# Patient Record
Sex: FEMALE | Race: WHITE | NOT HISPANIC OR LATINO | Employment: OTHER | URBAN - METROPOLITAN AREA
[De-identification: names, ages, dates, MRNs, and addresses within clinical notes are randomized per-mention and may not be internally consistent; named-entity substitution may affect disease eponyms.]

---

## 2018-05-15 ENCOUNTER — OFFICE VISIT CONVERTED (OUTPATIENT)
Dept: CARDIOLOGY | Facility: CLINIC | Age: 68
End: 2018-05-15
Attending: SPECIALIST

## 2018-05-15 ENCOUNTER — CONVERSION ENCOUNTER (OUTPATIENT)
Dept: CARDIOLOGY | Facility: CLINIC | Age: 68
End: 2018-05-15

## 2018-11-27 ENCOUNTER — OFFICE VISIT CONVERTED (OUTPATIENT)
Dept: CARDIOLOGY | Facility: CLINIC | Age: 68
End: 2018-11-27
Attending: SPECIALIST

## 2018-11-27 ENCOUNTER — CONVERSION ENCOUNTER (OUTPATIENT)
Dept: OTHER | Facility: HOSPITAL | Age: 68
End: 2018-11-27

## 2019-11-12 ENCOUNTER — OFFICE VISIT CONVERTED (OUTPATIENT)
Dept: CARDIOLOGY | Facility: CLINIC | Age: 69
End: 2019-11-12
Attending: SPECIALIST

## 2019-11-18 ENCOUNTER — OFFICE VISIT CONVERTED (OUTPATIENT)
Dept: SURGERY | Facility: CLINIC | Age: 69
End: 2019-11-18
Attending: SURGERY

## 2020-01-22 ENCOUNTER — HOSPITAL ENCOUNTER (OUTPATIENT)
Dept: GASTROENTEROLOGY | Facility: HOSPITAL | Age: 70
Setting detail: HOSPITAL OUTPATIENT SURGERY
Discharge: HOME | End: 2020-01-22
Attending: SURGERY

## 2020-01-22 LAB — GLUCOSE BLD-MCNC: 195 MG/DL (ref 65–99)

## 2020-05-22 ENCOUNTER — TELEPHONE CONVERTED (OUTPATIENT)
Dept: CARDIOLOGY | Facility: CLINIC | Age: 70
End: 2020-05-22
Attending: SPECIALIST

## 2020-08-25 ENCOUNTER — OFFICE VISIT CONVERTED (OUTPATIENT)
Dept: CARDIOLOGY | Facility: CLINIC | Age: 70
End: 2020-08-25
Attending: SPECIALIST

## 2020-11-24 ENCOUNTER — HOSPITAL ENCOUNTER (OUTPATIENT)
Dept: GENERAL RADIOLOGY | Facility: HOSPITAL | Age: 70
Discharge: HOME | End: 2020-11-24
Attending: NURSE PRACTITIONER

## 2021-03-23 ENCOUNTER — OFFICE VISIT CONVERTED (OUTPATIENT)
Dept: CARDIOLOGY | Facility: CLINIC | Age: 71
End: 2021-03-23
Attending: SPECIALIST

## 2021-05-13 NOTE — PROGRESS NOTES
Quick Note      Patient Name: Kimber Carranza   Patient ID: [id number]   Sex: Female   YOB: 1950    Primary Care Provider: Gt Graf MD   Referring Provider: Kelsie Maier NP    Visit Date: May 22, 2020    Provider: Clifford Donald MD   Location: [facility]   Location Address: [address] [place] [address], [facility] [[email or web address]] A   [place]ADIA De lCid  [id number]   Location Phone: [phone]          History Of Present Illness  TELEHEALTH TELEPHONE VISIT  Chief Complaint: Hypertension, Palpitations   Kimber Carranza is a 70 year old /White female with a history of hypertension and palpitations. Blood pressure well controlled at home. No further palpitations. She is presenting for evaluation via telehealth telephone visit. Verbal consent obtained before beginning visit. Telehealth visit due to COVID-19.   Provider spent 6 minutes with the patient during telehealth visit.   The following staff were present during this visit: Provider only.   Past Medical History/Overview of Patient Symptoms     CURRENT MEDICATIONS:  Metoprolol ER 50 mg daily; lisinopril 30 mg daily; metformin 1,000 mg daily; hydrochlorothiazide 12.5 mg daily; fish oil 1,200 mg daily; Omega-3 daily; Caltrate +D 600 mg daily; Advil p.r.n.; Prilosec 20 mg p.r.n.    PAST MEDICAL HISTORY:  Diabetes mellitus; Diverticulitis; Essential Hypertension; Palpitations.      FAMILY HISTORY:  Positive for diabetes mellitus and hypertension.  Negative for heart disease.      PSYCHOSOCIAL HISTORY:  Denies mood changes or depression.  Rarely consumes alcohol.  Denies tobacco use.  Walks for exercise.      REVIEW OF SYSTEMS:   Cardiovascular:  Denies palpitations (fast, fluttering, or skipping beats), swelling (feet, ankles, hands), shortness of breath while walking or lying flat, chest pain or angina pectoris   Respiratory: Denies chronic or frequent cough, asthma or wheezing       Vitals     Per patient, at-home vitals:   Blood pressure 126/70.   "Heart rate 60.  Height 5'3\".  Weight 180.           Assessment     ASSESSMENT & PLAN:    1.  Essential hypertension, controlled.  Continue lisinopril and hydrochlorothiazide.  2.  Nonrheumatic mitral valve insufficiency, stable.  3.  Stable palpitations.  Continue Toprol.  4.  See me back in 3-4 months.             Electronically Signed by: Kaitlin Leary-, Other -Author on June 2, 2020 09:27:54 AM  Electronically Co-signed by: Clifford Donald MD -Reviewer on Jo Ann 3, 2020 10:17:52 AM  "

## 2021-05-13 NOTE — PROGRESS NOTES
"   Progress Note      Patient Name: Kimber Carranza   Patient ID: [id number]   Sex: Female   YOB: 1950    Primary Care Provider: Gt Graf MD   Referring Provider: Kelsie Maier NP    Visit Date: August 25, 2020    Provider: Clifford Donald MD   Location: [facility]   Location Address: [address] [place] [address], [facility]   [place]  [id number]   Location Phone: [phone]          Chief Complaint  · Hypertension   · Palpitations       History Of Present Illness  Kimber Carranza is a 70 year old /White female with a history of hypertension and palpitations. No further palpitations. No syncopal or presyncopal episodes.   CURRENT MEDICATIONS: include Metoprolol ER 50 mg daily; HCTZ 12.5 mg daily; Lisinopril 30 mg daily; Metformin 1000 mg daily; fish oil 500 mg daily; Caltrate 1000 mg daily. The dosage and frequency of the medications were reviewed with the patient.   PAST MEDICAL HISTORY: Diabetes mellitus; diverticulitis; essential hypertension; palpitations.   FAMILY HISTORY: Positive for diabetes and hypertension. Negative for heart disease.   PSYCHOSOCIAL HISTORY: No history of mood changes or depression. She rarely drinks alcohol and never used tobacco.       Review of Systems  · Cardiovascular  o Denies  o : palpitations (fast, fluttering, or skipping beats), swelling (feet, ankles, hands), shortness of breath while walking or lying flat, chest pain or angina pectoris   · Respiratory  o Denies  o : chronic or frequent cough, asthma or wheezing      Vitals  Date Time BP Position Site L\R Cuff Size HR RR TEMP (F) WT  HT  BMI kg/m2 BSA m2 O2 Sat        08/25/2020 09:59 /72 Sitting    70 - R   184lbs 16oz 5'  2\" 33.84 1.92           Physical Examination  · Constitutional  o Appearance  o : Awake, alert, cooperative, pleasant.  · Respiratory  o Inspection of Chest  o : No chest wall deformities, moving equal.  o Auscultation of Lungs  o : Good air entry with " vesicular breath sounds.  · Cardiovascular  o Heart  o :   § Auscultation of Heart  § : S1 and S2 regular. No S3. No S4. No murmurs.  o Peripheral Vascular System  o :   § Extremities  § : Peripheral pulses were well felt. No edema. No cyanosis.  · Gastrointestinal  o Abdominal Examination  o : No masses or organomegaly noted.     EKG was performed in the office today.  Indication:       palpitations.  Results:           sinus rhythm, left axis deviation, poor R wave progression, non-specific ST wave changes.  Comparison:    No change from previous EKG.             Assessment     ASSESSMENT AND PLAN:    1.  Essential hypertension controlled:  Continue current dose of Lisinopril and HCTZ.  2.  Palpitations, stable:  Continue current dose of Metoprolol.  3.  See me back in 6 months.    Clifford Donald MD, [facility]  ISHAAN/anam           This note was transcribed by Zaynab Menon.  anam/ISHAAN  The above service was transcribed by Zaynab Menon, and I attest to the accuracy of the note.  ISHAAN               Electronically Signed by: Zaynab Menon-, -Author on August 26, 2020 06:34:16 AM  Electronically Co-signed by: lCifford Donald MD -Reviewer on August 27, 2020 08:46:14 PM

## 2021-05-14 VITALS
BODY MASS INDEX: 34.78 KG/M2 | WEIGHT: 189 LBS | HEART RATE: 64 BPM | DIASTOLIC BLOOD PRESSURE: 74 MMHG | HEIGHT: 62 IN | SYSTOLIC BLOOD PRESSURE: 146 MMHG

## 2021-05-14 VITALS
SYSTOLIC BLOOD PRESSURE: 138 MMHG | HEIGHT: 62 IN | HEART RATE: 70 BPM | DIASTOLIC BLOOD PRESSURE: 72 MMHG | WEIGHT: 185 LBS | BODY MASS INDEX: 34.04 KG/M2

## 2021-05-14 NOTE — PROGRESS NOTES
"   Progress Note      Patient Name: Kimber Carranza   Patient ID: [id number]   Sex: Female   YOB: 1950    Primary Care Provider: Gt Graf MD   Referring Provider: Kelsie Maier NP    Visit Date: March 23, 2021    Provider: Clifford Donald MD   Location: [facility]   Location Address: [address] [place] [address], [address]   [place]  [id number]   Location Phone: [phone]          Chief Complaint     Palpitations and hypertension.       History Of Present Illness  Kimber Carranza is a 71 year old /White female with a history of hypertension and palpitations. No further palpitations. Blood pressure controlled at home.   CURRENT MEDICATIONS: Medication list was reviewed and is as documented.   PAST MEDICAL HISTORY: Diabetes mellitus; diverticulitis; essential hypertension; palpitations.   PSYCHOSOCIAL HISTORY: Rarely uses alcohol. Denies tobacco use.      ALLERGIES: No known drug allergies.       Review of Systems  · Cardiovascular  o Denies  o : palpitations (fast, fluttering, or skipping beats), swelling (feet, ankles, hands), shortness of breath while walking or lying flat, chest pain or angina pectoris   · Respiratory  o Denies  o : chronic or frequent cough      Vitals  Date Time BP Position Site L\R Cuff Size HR RR TEMP (F) WT  HT  BMI kg/m2 BSA m2 O2 Sat FR L/min FiO2 HC       03/23/2021 10:55 /74 Sitting    64 - R   189lbs 0oz 5'  2\" 34.57 1.94       03/23/2021 10:55 /72 Sitting    64 - R                   Physical Examination  · Constitutional  o Appearance  o : Awake, alert, cooperative, pleasant.  · Respiratory  o Inspection of Chest  o : No chest wall deformities, moving equal.  o Auscultation of Lungs  o : Good air entry with vesicular breath sounds.  · Cardiovascular  o Heart  o :   § Auscultation of Heart  § : S1 and S2 regular. No S3. No S4. No murmurs.  o Peripheral Vascular System  o :   § Extremities  § : Peripheral pulses were well felt. No edema. No " cyanosis.  · Gastrointestinal  o Abdominal Examination  o : No masses or organomegaly noted.          Assessment     1.  Stable palpitations. Continue current dose of metoprolol.  2.  Essential hypertension, controlled. Continue current dose of lisinopril and hydrochlorothiazide.     FOLLOW-UP:  See me back in 4 months.        Clifford Donald MD  ISHAAN/vh               Electronically Signed by: Paulette Jauregui-, OT -Author on April 3, 2021 09:04:54 AM  Electronically Co-signed by: Clifford Donald MD -Reviewer on April 4, 2021 10:06:53 AM

## 2021-05-15 VITALS
SYSTOLIC BLOOD PRESSURE: 130 MMHG | HEIGHT: 64 IN | WEIGHT: 185 LBS | DIASTOLIC BLOOD PRESSURE: 74 MMHG | HEART RATE: 68 BPM | BODY MASS INDEX: 31.58 KG/M2

## 2021-05-15 VITALS — HEIGHT: 63 IN | RESPIRATION RATE: 14 BRPM | BODY MASS INDEX: 33.02 KG/M2 | WEIGHT: 186.37 LBS

## 2021-05-16 VITALS
HEART RATE: 62 BPM | DIASTOLIC BLOOD PRESSURE: 82 MMHG | BODY MASS INDEX: 32.95 KG/M2 | WEIGHT: 193 LBS | HEIGHT: 64 IN | SYSTOLIC BLOOD PRESSURE: 126 MMHG

## 2021-05-16 VITALS
HEIGHT: 64 IN | DIASTOLIC BLOOD PRESSURE: 58 MMHG | SYSTOLIC BLOOD PRESSURE: 130 MMHG | HEART RATE: 60 BPM | WEIGHT: 192 LBS | BODY MASS INDEX: 32.78 KG/M2

## 2021-07-28 ENCOUNTER — OFFICE VISIT (OUTPATIENT)
Dept: CARDIOLOGY | Facility: CLINIC | Age: 71
End: 2021-07-28

## 2021-07-28 VITALS
DIASTOLIC BLOOD PRESSURE: 72 MMHG | HEART RATE: 66 BPM | SYSTOLIC BLOOD PRESSURE: 130 MMHG | BODY MASS INDEX: 35.51 KG/M2 | WEIGHT: 193 LBS | HEIGHT: 62 IN

## 2021-07-28 DIAGNOSIS — R00.2 PALPITATIONS: ICD-10-CM

## 2021-07-28 DIAGNOSIS — I10 ESSENTIAL HYPERTENSION: Primary | ICD-10-CM

## 2021-07-28 PROCEDURE — 99213 OFFICE O/P EST LOW 20 MIN: CPT | Performed by: NURSE PRACTITIONER

## 2021-07-28 RX ORDER — LISINOPRIL 30 MG/1
30 TABLET ORAL DAILY
COMMUNITY
Start: 2021-07-06 | End: 2021-10-22 | Stop reason: HOSPADM

## 2021-07-28 RX ORDER — METOPROLOL SUCCINATE 50 MG/1
50 TABLET, EXTENDED RELEASE ORAL DAILY
Qty: 90 TABLET | Refills: 3 | Status: SHIPPED | OUTPATIENT
Start: 2021-07-28 | End: 2021-10-22 | Stop reason: HOSPADM

## 2021-07-28 RX ORDER — METOPROLOL SUCCINATE 50 MG/1
50 TABLET, EXTENDED RELEASE ORAL DAILY
COMMUNITY
End: 2021-07-28 | Stop reason: SDUPTHER

## 2021-07-28 RX ORDER — HYDROCHLOROTHIAZIDE 12.5 MG/1
12.5 CAPSULE, GELATIN COATED ORAL DAILY
COMMUNITY
Start: 2021-06-03 | End: 2021-10-22 | Stop reason: HOSPADM

## 2021-07-28 NOTE — PROGRESS NOTES
"Chief Complaint  Follow-up    Subjective            History of Present Illness  Kimber Carranza is a 71-year-old white/ female patient who presents to the office today for follow-up.  She has history of essential hypertension and palpitations.  She brought with her a blood pressure log from home with readings from the past month showing elevated systolic blood pressure in the range of 130-150.  She reports compliance with all of her medications and states that the edema in her lower extremities has \"improved\".  She reports that she has increased her physical activity by \"walking more\".  She denies any chest pain, shortness of breath, lightheadedness, or palpitations.  She admits that she is planning to start back on her diet which will consist of \"low-carb and high-protein\" food choices which she is hoping will help to improve her blood pressure readings.    PMH  Past Medical History:   Diagnosis Date   • Diabetes mellitus (CMS/[facility])    • Essential hypertension 7/28/2021   • Palpitations 7/28/2021         ALLERGY  Allergies   Allergen Reactions   • Statins Other (See Comments)          SURGICALHX  History reviewed. No pertinent surgical history.       SOC  Social History     Socioeconomic History   • Marital status:      Spouse name: Not on file   • Number of children: Not on file   • Years of education: Not on file   • Highest education level: Not on file   Tobacco Use   • Smoking status: Never Smoker   • Smokeless tobacco: Never Used   Vaping Use   • Vaping Use: Never used   Substance and Sexual Activity   • Alcohol use: Never   • Drug use: Never   • Sexual activity: Defer         FAMHX  Family History   Problem Relation Age of Onset   • Hypertension Father           MEDSIGONLY  Current Outpatient Medications on File Prior to Visit   Medication Sig   • hydroCHLOROthiazide (MICROZIDE) 12.5 MG capsule Take 12.5 mg by mouth Daily.   • lisinopril (PRINIVIL,ZESTRIL) 30 MG tablet Take 30 mg by mouth " "Daily.   • metFORMIN (GLUCOPHAGE) 1000 MG tablet Take 1,000 mg by mouth 2 (two) times a day.   • metoprolol succinate XL (TOPROL-XL) 50 MG 24 hr tablet Take 50 mg by mouth Daily.     No current facility-administered medications on file prior to visit.         Objective   /72 (Patient Position: Sitting)   Pulse 66   Ht 157.5 cm (62\")   Wt 87.5 kg (193 lb)   BMI 35.30 kg/m²       Physical Exam  Constitutional:       Appearance: She is obese.   HENT:      Head: Normocephalic.   Cardiovascular:      Rate and Rhythm: Normal rate and regular rhythm.      Pulses: Normal pulses.      Heart sounds: Normal heart sounds. Heart sounds not distant.   Pulmonary:      Effort: Pulmonary effort is normal.      Breath sounds: Normal breath sounds.   Musculoskeletal:      Cervical back: Neck supple.      Right lower leg: No edema.      Left lower leg: No edema.   Skin:     General: Skin is dry.   Neurological:      Mental Status: She is alert and oriented to person, place, and time.   Psychiatric:         Mood and Affect: Mood normal.         Behavior: Behavior normal.       Result Review :   The following data was reviewed by: CAMI Medley on 07/28/2021:  No results found for: PROBNP    CBC w/diff    CBC w/Diff 7/27/21   WBC 7.53   RBC 4.58   Hemoglobin 13.2   Hematocrit 39.7   MCV 86.7   MCH 28.8   MCHC 33.2   RDW 12.2   Platelets 228   Neutrophil Rel % 58.4   Immature Granulocyte Rel % 0.4   Lymphocyte Rel % 29.1   Monocyte Rel % 8.4   Eosinophil Rel % 3.3   Basophil Rel % 0.4            No results found for: TSH   No results found for: FREET4   No results found for: DDIMERQUANT  No results found for: MG   No results found for: DIGOXIN   No results found for: TROPONINT       Lab 07/27/21  0738   HEMOGLOBIN A1C 8.3*             Assessment and Plan    Diagnoses and all orders for this visit:    1. Essential hypertension (Primary)   Currently still with some systolic hypertension, however she is making some " lifestyle modifications.  I advised that she continue to keep the home blood pressure log for the next month and to notify me if the systolic blood pressure remains above 140 after she has modified her diet.  We discussed low-sodium diet.  She verbalizes understanding and agrees at this time.    2. Palpitations   Currently controlled and without adverse effects of medications so continue current dose of metoprolol.  -     metoprolol succinate XL (TOPROL-XL) 50 MG 24 hr tablet; Take 1 tablet by mouth Daily.  Dispense: 90 tablet; Refill: 3        Follow Up   Return in about 6 months (around 1/28/2022) for Follow up with Dr Donald.    Patient was given instructions and counseling regarding her condition or for health maintenance advice. Please see specific information pulled into the AVS if appropriate.     Kimber Carranza  reports that she has never smoked. She has never used smokeless tobacco.       Lety Whitten, CAMI  07/28/21  11:07 EDT    Dictated Utilizing Dragon Dictation

## 2021-07-28 NOTE — PATIENT INSTRUCTIONS
Palpitations  Palpitations are feelings that your heartbeat is not normal. Your heartbeat may feel like it is:  · Uneven.  · Faster than normal.  · Fluttering.  · Skipping a beat.  This is usually not a serious problem. In some cases, you may need tests to rule out any serious problems.  Follow these instructions at home:  Pay attention to any changes in your condition. Take these actions to help manage your symptoms:  Eating and drinking  · Avoid:  ? Coffee, tea, soft drinks, and energy drinks.  ? Chocolate.  ? Alcohol.  ? Diet pills.  Lifestyle    · Try to lower your stress. These things can help you relax:  ? Yoga.  ? Deep breathing and meditation.  ? Exercise.  ? Using words and images to create positive thoughts (guided imagery).  ? Using your mind to control things in your body (biofeedback).  · Do not use drugs.  · Get plenty of rest and sleep. Keep a regular bed time.  General instructions    · Take over-the-counter and prescription medicines only as told by your doctor.  · Do not use any products that contain nicotine or tobacco, such as cigarettes and e-cigarettes. If you need help quitting, ask your doctor.  · Keep all follow-up visits as told by your doctor. This is important. You may need more tests if palpitations do not go away or get worse.  Contact a doctor if:  · Your symptoms last more than 24 hours.  · Your symptoms occur more often.  Get help right away if you:  · Have chest pain.  · Feel short of breath.  · Have a very bad headache.  · Feel dizzy.  · Pass out (faint).  Summary  · Palpitations are feelings that your heartbeat is uneven or faster than normal. It may feel like your heart is fluttering or skipping a beat.  · Avoid food and drinks that may cause palpitations. These include caffeine, chocolate, and alcohol.  · Try to lower your stress. Do not smoke or use drugs.  · Get help right away if you faint or have chest pain, shortness of breath, a severe headache, or dizziness.  This  "information is not intended to replace advice given to you by your health care provider. Make sure you discuss any questions you have with your health care provider.  Document Revised: 01/30/2019 Document Reviewed: 01/30/2019  [employer] Patient Education © 2021 [employer]      Low-Sodium Eating Plan  Sodium, which is an element that makes up salt, helps you maintain a healthy balance of fluids in your body. Too much sodium can increase your blood pressure and cause fluid and waste to be held in your body.  Your health care provider or dietitian may recommend following this plan if you have high blood pressure (hypertension), kidney disease, liver disease, or heart failure. Eating less sodium can help lower your blood pressure, reduce swelling, and protect your heart, liver, and kidneys.  What are tips for following this plan?  Reading food labels  · The Nutrition Facts label lists the amount of sodium in one serving of the food. If you eat more than one serving, you must multiply the listed amount of sodium by the number of servings.  · Choose foods with less than 140 mg of sodium per serving.  · Avoid foods with 300 mg of sodium or more per serving.  Shopping    · Look for lower-sodium products, often labeled as \"low-sodium\" or \"no salt added.\"  · Always check the sodium content, even if foods are labeled as \"unsalted\" or \"no salt added.\"  · Buy fresh foods.  ? Avoid canned foods and pre-made or frozen meals.  ? Avoid canned, cured, or processed meats.  · Buy breads that have less than 80 mg of sodium per slice.  Cooking    · Eat more home-cooked food and less restaurant, buffet, and fast food.  · Avoid adding salt when cooking. Use salt-free seasonings or herbs instead of table salt or sea salt. Check with your health care provider or pharmacist before using salt substitutes.  · Cook with plant-based oils, such as canola, sunflower, or olive oil.  Meal planning  · When eating at a restaurant, ask that your food " "be prepared with less salt or no salt, if possible. Avoid dishes labeled as brined, pickled, cured, smoked, or made with soy sauce, miso, or teriyaki sauce.  · Avoid foods that contain MSG (monosodium glutamate). MSG is sometimes added to Chinese food, bouillon, and some canned foods.  · Make meals that can be grilled, baked, poached, roasted, or steamed. These are generally made with less sodium.  General information  Most people on this plan should limit their sodium intake to 1,500-2,000 mg (milligrams) of sodium each day.  What foods should I eat?  Fruits  Fresh, frozen, or canned fruit. Fruit juice.  Vegetables  Fresh or frozen vegetables. \"No salt added\" canned vegetables. \"No salt added\" tomato sauce and paste. Low-sodium or reduced-sodium tomato and vegetable juice.  Grains  Low-sodium cereals, including oats, puffed wheat and rice, and shredded wheat. Low-sodium crackers. Unsalted rice. Unsalted pasta. Low-sodium bread. Whole-grain breads and whole-grain pasta.  Meats and other proteins  Fresh or frozen (no salt added) meat, poultry, seafood, and fish. Low-sodium canned tuna and salmon. Unsalted nuts. Dried peas, beans, and lentils without added salt. Unsalted canned beans. Eggs. Unsalted nut butters.  Dairy  Milk. Soy milk. Cheese that is naturally low in sodium, such as ricotta cheese, fresh mozzarella, or Swiss cheese. Low-sodium or reduced-sodium cheese. Cream cheese. Yogurt.  Seasonings and condiments  Fresh and dried herbs and spices. Salt-free seasonings. Low-sodium mustard and ketchup. Sodium-free salad dressing. Sodium-free light mayonnaise. Fresh or refrigerated horseradish. Lemon juice. Vinegar.  Other foods  Homemade, reduced-sodium, or low-sodium soups. Unsalted popcorn and pretzels. Low-salt or salt-free chips.  The items listed above may not be a complete list of foods and beverages you can eat. Contact a dietitian for more information.  What foods should I avoid?  Vegetables  Deepthi, " pickled vegetables, and relishes. Olives. French fries. Onion rings. Regular canned vegetables (not low-sodium or reduced-sodium). Regular canned tomato sauce and paste (not low-sodium or reduced-sodium). Regular tomato and vegetable juice (not low-sodium or reduced-sodium). Frozen vegetables in sauces.  Grains  Instant hot cereals. Bread stuffing, pancake, and biscuit mixes. Croutons. Seasoned rice or pasta mixes. Noodle soup cups. Boxed or frozen macaroni and cheese. Regular salted crackers. Self-rising flour.  Meats and other proteins  Meat or fish that is salted, canned, smoked, spiced, or pickled. Precooked or cured meat, such as sausages or meat loaves. Dias. Ham. Pepperoni. Hot dogs. Corned beef. Chipped beef. Salt pork. Jerky. Pickled herring. Anchovies and sardines. Regular canned tuna. Salted nuts.  Dairy  Processed cheese and cheese spreads. Hard cheeses. Cheese curds. Blue cheese. Feta cheese. String cheese. Regular cottage cheese. Buttermilk. Canned milk.  Fats and oils  Salted butter. Regular margarine. Ghee. Dias fat.  Seasonings and condiments  Onion salt, garlic salt, seasoned salt, table salt, and sea salt. Canned and packaged gravies. [facility] Hospital[facility] sauce. Tartar sauce. Barbecue sauce. Teriyaki sauce. Soy sauce, including reduced-sodium. Steak sauce. Fish sauce. Oyster sauce. Cocktail sauce. Horseradish that you find on the shelf. Regular ketchup and mustard. Meat flavorings and tenderizers. Bouillon cubes. Hot sauce. Pre-made or packaged marinades. Pre-made or packaged taco seasonings. Relishes. Regular salad dressings. Salsa.  Other foods  Salted popcorn and pretzels. Corn chips and puffs. Potato and tortilla chips. Canned or dried soups. Pizza. Frozen entrees and pot pies.  The items listed above may not be a complete list of foods and beverages you should avoid. Contact a dietitian for more information.  Summary  · Eating less sodium can help lower your blood pressure, reduce swelling, and  protect your heart, liver, and kidneys.  · Most people on this plan should limit their sodium intake to 1,500-2,000 mg (milligrams) of sodium each day.  · Canned, boxed, and frozen foods are high in sodium. Restaurant foods, fast foods, and pizza are also very high in sodium. You also get sodium by adding salt to food.  · Try to cook at home, eat more fresh fruits and vegetables, and eat less fast food and canned, processed, or prepared foods.  This information is not intended to replace advice given to you by your health care provider. Make sure you discuss any questions you have with your health care provider.  Document Revised: 01/22/2021 Document Reviewed: 11/18/2020  [employer] Patient Education © 2021 [employer]

## 2021-10-18 ENCOUNTER — HOSPITAL ENCOUNTER (INPATIENT)
Facility: HOSPITAL | Age: 71
LOS: 4 days | Discharge: NURSING, REHAB OR LONG-TERM CARE | End: 2021-10-22
Attending: EMERGENCY MEDICINE | Admitting: FAMILY MEDICINE

## 2021-10-18 ENCOUNTER — APPOINTMENT (OUTPATIENT)
Dept: MRI IMAGING | Facility: HOSPITAL | Age: 71
End: 2021-10-18

## 2021-10-18 ENCOUNTER — APPOINTMENT (OUTPATIENT)
Dept: CT IMAGING | Facility: HOSPITAL | Age: 71
End: 2021-10-18

## 2021-10-18 ENCOUNTER — APPOINTMENT (OUTPATIENT)
Dept: GENERAL RADIOLOGY | Facility: HOSPITAL | Age: 71
End: 2021-10-18

## 2021-10-18 DIAGNOSIS — Z78.9 DECREASED ACTIVITIES OF DAILY LIVING (ADL): ICD-10-CM

## 2021-10-18 DIAGNOSIS — I10 PRIMARY HYPERTENSION: ICD-10-CM

## 2021-10-18 DIAGNOSIS — I60.9 SUBARACHNOID HEMORRHAGE (HCC): ICD-10-CM

## 2021-10-18 DIAGNOSIS — R26.2 DIFFICULTY WALKING: ICD-10-CM

## 2021-10-18 DIAGNOSIS — I61.9 INTRAPARENCHYMAL HEMORRHAGE OF BRAIN (HCC): Primary | ICD-10-CM

## 2021-10-18 LAB
ABO GROUP BLD: NORMAL
ABO GROUP BLD: NORMAL
ALBUMIN SERPL-MCNC: 4.4 G/DL (ref 3.5–5.2)
ALBUMIN/GLOB SERPL: 1.6 G/DL
ALP SERPL-CCNC: 56 U/L (ref 39–117)
ALT SERPL W P-5'-P-CCNC: 43 U/L (ref 1–33)
ANION GAP SERPL CALCULATED.3IONS-SCNC: 12.7 MMOL/L (ref 5–15)
AST SERPL-CCNC: 38 U/L (ref 1–32)
BASOPHILS # BLD AUTO: 0.04 10*3/MM3 (ref 0–0.2)
BASOPHILS NFR BLD AUTO: 0.4 % (ref 0–1.5)
BILIRUB SERPL-MCNC: 0.5 MG/DL (ref 0–1.2)
BLD GP AB SCN SERPL QL: NEGATIVE
BUN SERPL-MCNC: 14 MG/DL (ref 8–23)
BUN/CREAT SERPL: 14.7 (ref 7–25)
CALCIUM SPEC-SCNC: 9.3 MG/DL (ref 8.6–10.5)
CHLORIDE SERPL-SCNC: 98 MMOL/L (ref 98–107)
CO2 SERPL-SCNC: 25.3 MMOL/L (ref 22–29)
CREAT BLDA-MCNC: 0.8 MG/DL
CREAT SERPL-MCNC: 0.95 MG/DL (ref 0.57–1)
DEPRECATED RDW RBC AUTO: 39.8 FL (ref 37–54)
EOSINOPHIL # BLD AUTO: 0.08 10*3/MM3 (ref 0–0.4)
EOSINOPHIL NFR BLD AUTO: 0.8 % (ref 0.3–6.2)
ERYTHROCYTE [DISTWIDTH] IN BLOOD BY AUTOMATED COUNT: 12.5 % (ref 12.3–15.4)
GFR SERPL CREATININE-BSD FRML MDRD: 58 ML/MIN/1.73
GLOBULIN UR ELPH-MCNC: 2.8 GM/DL
GLUCOSE BLDC GLUCOMTR-MCNC: 159 MG/DL (ref 70–99)
GLUCOSE BLDC GLUCOMTR-MCNC: 166 MG/DL (ref 70–99)
GLUCOSE BLDC GLUCOMTR-MCNC: 196 MG/DL (ref 70–99)
GLUCOSE SERPL-MCNC: 177 MG/DL (ref 65–99)
HCT VFR BLD AUTO: 41.4 % (ref 34–46.6)
HGB BLD-MCNC: 13.9 G/DL (ref 12–15.9)
HOLD SPECIMEN: NORMAL
HOLD SPECIMEN: NORMAL
IMM GRANULOCYTES # BLD AUTO: 0.05 10*3/MM3 (ref 0–0.05)
IMM GRANULOCYTES NFR BLD AUTO: 0.5 % (ref 0–0.5)
INR PPP: 0.99 (ref 2–3)
LYMPHOCYTES # BLD AUTO: 1.57 10*3/MM3 (ref 0.7–3.1)
LYMPHOCYTES NFR BLD AUTO: 14.7 % (ref 19.6–45.3)
MCH RBC QN AUTO: 29.1 PG (ref 26.6–33)
MCHC RBC AUTO-ENTMCNC: 33.6 G/DL (ref 31.5–35.7)
MCV RBC AUTO: 86.8 FL (ref 79–97)
MONOCYTES # BLD AUTO: 0.53 10*3/MM3 (ref 0.1–0.9)
MONOCYTES NFR BLD AUTO: 5 % (ref 5–12)
NEUTROPHILS NFR BLD AUTO: 78.6 % (ref 42.7–76)
NEUTROPHILS NFR BLD AUTO: 8.38 10*3/MM3 (ref 1.7–7)
NRBC BLD AUTO-RTO: 0 /100 WBC (ref 0–0.2)
PLATELET # BLD AUTO: 204 10*3/MM3 (ref 140–450)
PMV BLD AUTO: 10.7 FL (ref 6–12)
POTASSIUM SERPL-SCNC: 4 MMOL/L (ref 3.5–5.2)
PROT SERPL-MCNC: 7.2 G/DL (ref 6–8.5)
PROTHROMBIN TIME: 10.9 SECONDS (ref 9.4–12)
RBC # BLD AUTO: 4.77 10*6/MM3 (ref 3.77–5.28)
RH BLD: POSITIVE
RH BLD: POSITIVE
SODIUM SERPL-SCNC: 136 MMOL/L (ref 136–145)
T&S EXPIRATION DATE: NORMAL
WBC # BLD AUTO: 10.65 10*3/MM3 (ref 3.4–10.8)
WHOLE BLOOD HOLD SPECIMEN: NORMAL
WHOLE BLOOD HOLD SPECIMEN: NORMAL

## 2021-10-18 PROCEDURE — 25010000002 CLEVIDIPINE 50 MG/100ML EMULSION: Performed by: FAMILY MEDICINE

## 2021-10-18 PROCEDURE — 99291 CRITICAL CARE FIRST HOUR: CPT | Performed by: INTERNAL MEDICINE

## 2021-10-18 PROCEDURE — 25010000002 CLEVIDIPINE BUTYRATE PER 1 MG: Performed by: EMERGENCY MEDICINE

## 2021-10-18 PROCEDURE — C9248 INJ, CLEVIDIPINE BUTYRATE: HCPCS | Performed by: FAMILY MEDICINE

## 2021-10-18 PROCEDURE — 86901 BLOOD TYPING SEROLOGIC RH(D): CPT | Performed by: EMERGENCY MEDICINE

## 2021-10-18 PROCEDURE — 80053 COMPREHEN METABOLIC PANEL: CPT | Performed by: EMERGENCY MEDICINE

## 2021-10-18 PROCEDURE — 85610 PROTHROMBIN TIME: CPT | Performed by: EMERGENCY MEDICINE

## 2021-10-18 PROCEDURE — 0 IOPAMIDOL PER 1 ML: Performed by: EMERGENCY MEDICINE

## 2021-10-18 PROCEDURE — 70551 MRI BRAIN STEM W/O DYE: CPT

## 2021-10-18 PROCEDURE — 82962 GLUCOSE BLOOD TEST: CPT

## 2021-10-18 PROCEDURE — 86901 BLOOD TYPING SEROLOGIC RH(D): CPT

## 2021-10-18 PROCEDURE — 85025 COMPLETE CBC W/AUTO DIFF WBC: CPT | Performed by: EMERGENCY MEDICINE

## 2021-10-18 PROCEDURE — 93005 ELECTROCARDIOGRAM TRACING: CPT | Performed by: EMERGENCY MEDICINE

## 2021-10-18 PROCEDURE — C9248 INJ, CLEVIDIPINE BUTYRATE: HCPCS | Performed by: EMERGENCY MEDICINE

## 2021-10-18 PROCEDURE — 94799 UNLISTED PULMONARY SVC/PX: CPT

## 2021-10-18 PROCEDURE — 71045 X-RAY EXAM CHEST 1 VIEW: CPT

## 2021-10-18 PROCEDURE — 94762 N-INVAS EAR/PLS OXIMTRY CONT: CPT

## 2021-10-18 PROCEDURE — 99284 EMERGENCY DEPT VISIT MOD MDM: CPT

## 2021-10-18 PROCEDURE — 99223 1ST HOSP IP/OBS HIGH 75: CPT | Performed by: FAMILY MEDICINE

## 2021-10-18 PROCEDURE — 86900 BLOOD TYPING SEROLOGIC ABO: CPT

## 2021-10-18 PROCEDURE — 86850 RBC ANTIBODY SCREEN: CPT | Performed by: EMERGENCY MEDICINE

## 2021-10-18 PROCEDURE — 70450 CT HEAD/BRAIN W/O DYE: CPT

## 2021-10-18 PROCEDURE — 82565 ASSAY OF CREATININE: CPT

## 2021-10-18 PROCEDURE — 86900 BLOOD TYPING SEROLOGIC ABO: CPT | Performed by: EMERGENCY MEDICINE

## 2021-10-18 PROCEDURE — 70496 CT ANGIOGRAPHY HEAD: CPT

## 2021-10-18 RX ORDER — SODIUM CHLORIDE 0.9 % (FLUSH) 0.9 %
10 SYRINGE (ML) INJECTION EVERY 12 HOURS SCHEDULED
Status: DISCONTINUED | OUTPATIENT
Start: 2021-10-18 | End: 2021-10-22 | Stop reason: HOSPADM

## 2021-10-18 RX ORDER — ACETAMINOPHEN 325 MG/1
650 TABLET ORAL EVERY 6 HOURS PRN
Status: DISCONTINUED | OUTPATIENT
Start: 2021-10-18 | End: 2021-10-22 | Stop reason: HOSPADM

## 2021-10-18 RX ORDER — SODIUM CHLORIDE 0.9 % (FLUSH) 0.9 %
10 SYRINGE (ML) INJECTION AS NEEDED
Status: DISCONTINUED | OUTPATIENT
Start: 2021-10-18 | End: 2021-10-22 | Stop reason: HOSPADM

## 2021-10-18 RX ORDER — DEXTROSE MONOHYDRATE 100 MG/ML
25 INJECTION, SOLUTION INTRAVENOUS
Status: DISCONTINUED | OUTPATIENT
Start: 2021-10-18 | End: 2021-10-22 | Stop reason: HOSPADM

## 2021-10-18 RX ORDER — LABETALOL HYDROCHLORIDE 5 MG/ML
40 INJECTION, SOLUTION INTRAVENOUS EVERY 4 HOURS
Status: DISCONTINUED | OUTPATIENT
Start: 2021-10-18 | End: 2021-10-19

## 2021-10-18 RX ORDER — ONDANSETRON 2 MG/ML
4 INJECTION INTRAMUSCULAR; INTRAVENOUS EVERY 6 HOURS PRN
Status: DISCONTINUED | OUTPATIENT
Start: 2021-10-18 | End: 2021-10-22 | Stop reason: HOSPADM

## 2021-10-18 RX ORDER — MELATONIN
1000 DAILY
COMMUNITY

## 2021-10-18 RX ORDER — VERAPAMIL HYDROCHLORIDE 2.5 MG/ML
5 INJECTION, SOLUTION INTRAVENOUS ONCE
Status: COMPLETED | OUTPATIENT
Start: 2021-10-18 | End: 2021-10-18

## 2021-10-18 RX ORDER — NICOTINE POLACRILEX 4 MG
15 LOZENGE BUCCAL
Status: DISCONTINUED | OUTPATIENT
Start: 2021-10-18 | End: 2021-10-22 | Stop reason: HOSPADM

## 2021-10-18 RX ORDER — CHLORAL HYDRATE 500 MG
1200 CAPSULE ORAL
COMMUNITY

## 2021-10-18 RX ADMIN — CLEVIPIDINE 6 MG/HR: 0.5 EMULSION INTRAVENOUS at 16:20

## 2021-10-18 RX ADMIN — CLEVIPIDINE 2 MG/HR: 0.5 EMULSION INTRAVENOUS at 11:37

## 2021-10-18 RX ADMIN — CLEVIPIDINE 2 MG/HR: 0.5 EMULSION INTRAVENOUS at 11:33

## 2021-10-18 RX ADMIN — VERAPAMIL HYDROCHLORIDE 5 MG: 2.5 INJECTION, SOLUTION INTRAVENOUS at 18:41

## 2021-10-18 RX ADMIN — IOPAMIDOL 100 ML: 755 INJECTION, SOLUTION INTRAVENOUS at 14:25

## 2021-10-18 RX ADMIN — ACETAMINOPHEN 650 MG: 325 TABLET ORAL at 23:33

## 2021-10-18 RX ADMIN — SODIUM CHLORIDE, PRESERVATIVE FREE 10 ML: 5 INJECTION INTRAVENOUS at 22:30

## 2021-10-18 NOTE — ED PROVIDER NOTES
Subjective   History of Present Illness  The patient is a 71-year-old female who presents to the ER with her family for evaluation of confusion/disorientation.  Patient states that she went to bed last night her normal time without any complaints.  She states that she woke up this morning feeling very disoriented.  When asked what she meant by this she states that she is very confused, she could not figure out stuff.  She gave an example if she did not know how to make tea this morning.  She also complains of some pain and pressure on the right side of her head and retro-orbital in nature.  Denies being on any blood thinners.  Denies recent head trauma or fall.    Review of Systems   Constitutional: Negative.    Eyes: Negative.    Respiratory: Negative.    Cardiovascular: Negative.    Gastrointestinal: Negative.    Endocrine: Negative.    Genitourinary: Negative.    Musculoskeletal: Negative.    Skin: Negative.    Allergic/Immunologic: Negative.    Neurological: Positive for headaches.   Hematological: Negative.    Psychiatric/Behavioral: Negative.    All other systems reviewed and are negative.      Past Medical History:   Diagnosis Date   • Diabetes mellitus (HCC)    • Essential hypertension 7/28/2021   • Palpitations 7/28/2021       Allergies   Allergen Reactions   • Statins Other (See Comments)       Past Surgical History:   Procedure Laterality Date   • HYSTERECTOMY         Family History   Problem Relation Age of Onset   • Hypertension Father        Social History     Socioeconomic History   • Marital status:    Tobacco Use   • Smoking status: Never Smoker   • Smokeless tobacco: Never Used   Vaping Use   • Vaping Use: Never used   Substance and Sexual Activity   • Alcohol use: Never   • Drug use: Never   • Sexual activity: Defer           Objective   Physical Exam  Vital signs were reviewed.  General appearance - Patient appears well-developed and well-nourished.  Patient is in no acute distress.  Head  - Normocephalic, atraumatic.  Pupils - Equal, round, reactive to light.  Extraocular muscles are intact.  Conjunctive is clear  Tympanic membranes - Gray, intact without erythema or retractions.  Oral mucosa - Pink and moist without lesions or erythema.  Uvula is midline.  Neck - Supple.  Trachea was midline.  There is no palpable lymphadenopathy or thyromegaly.  There are no meningeal signs  Lungs - Clear to auscultation and percussion bilaterally.  Heart - Regular rate and rhythm without any murmurs, clicks, or gallops.  Abdomen - Soft.  Bowel sounds are present.  There is no rebound, guarding, or rigidity.  There are no palpable masses.  There are no pulsatile masses.  Back - Spine is straight and midline.  There is no CVA tenderness.  Extremities - Intact x4 with full range of motion.  There is no palpable edema.  Neurologic - Patient is awake, alert, and oriented x3.  Answers all questions appropriately.  Cranial nerves II through XII are grossly intact.  Motor and sensory functions grossly intact.  Cerebellar function was normal.  Integument - There are no rashes.  There are no petechia or purpura lesions noted.  There are no vesicular lesions noted.    Procedures           ED Course         EKG performed at 1213 was interpreted by me to show a normal sinus rhythm with a ventricular rate of 83 beats minute.  The computer read this up to be atrial fibrillation however feel this is a sinus rhythm as I see P waves before every QRS weight.  P waves are normal.  QRS interval was normal.  Axis was leftward -51 degrees.  There is no acute ischemic ST or T wave change identified.  QT corrected was normal at 409 ms.    The patient was seen evaluated by me in the ED.  The above history and physical examination was performed as stated.  CT scan was reviewed by me.  I discussed the findings also with the radiologist.  The patient along with her  and daughter were also informed of the CT findings.  I did consult   Samy neurosurgeon, at 1230 who reviewed the CT findings and agreed that the patient could be managed at [facility].  Patient be admitted to the ICU.  Patient was started on Cleviprex with a target systolic blood pressure goal of 140.  Hospital service was consulted for admission.  Dr. Lovett did asked I order a CTA of the head which he will follow up on the results.      45 minutes of critical care provided. This time excludes other billable procedures. Time does include preparation of documents, medical consultations, review of old records, and direct bedside care. Patient was at high risk for life-threatening deterioration due to intracranial hemorrhage and systolic hypertension management.                                MDM    Final diagnoses:   Intraparenchymal hemorrhage of brain (HCC)   Subarachnoid hemorrhage (HCC)   Primary hypertension       ED Disposition  ED Disposition     ED Disposition Condition Comment    Decision to Admit            No follow-up provider specified.       Medication List      No changes were made to your prescriptions during this visit.          Kendall Taylor,   10/18/21 [id number]

## 2021-10-18 NOTE — H&P
[facility]  Date: 10/18/2021   Patient Name: Kimber Carranza  : 1950  MRN: [id number]  Primary Care Physician:  Kelsie Maier, [facility]  Date of admission: 10/18/2021    Subjective   Subjective     Chief complaint: Altered mental status    HPI:  71-year-old female with history of essential hypertension, diabetes mellitus, mixed hyperlipidemia, presented to the emergency room with chief complaint of altered mental status. Woke up with cofusion.  stated at the bedside he thinks symptoms occurred the night before when she became frustrated while trying to pay bills online.  She went to bed and woke up with worsening confusion, complaints of a headache, mostly right-sided, holding her right eye.  There is no recent illness, fevers, chills or sweats.  There is no reported head trauma either or loss of consciousness.In the emergency room she was found to be profoundly hypertensive systolic in the 180s, head CT revealed intraparenchymal hemorrhage in the right occipital lobe, right temporal lobe, with subarachnoid hemorrhage within both parietal lobes and inferior right temporal lobe.  Neurosurgery was consulted, hospital service requested to admit and further manage.    Personal History     Past Medical History:  Past Medical History:   Diagnosis Date   • Diabetes mellitus (HCC)    • Essential hypertension 2021   • Palpitations 2021       Past Surgical History:  Past Surgical History:   Procedure Laterality Date   • HYSTERECTOMY           Family History:   Family History   Problem Relation Age of Onset   • Hypertension Father          Social History:   Social History     Socioeconomic History   • Marital status:    Tobacco Use   • Smoking status: Never Smoker   • Smokeless tobacco: Never Used   Vaping Use   • Vaping Use: Never used   Substance and Sexual Activity   • Alcohol use: Never   • Drug use: Never   • Sexual activity: Defer         Home  Medications:  cholecalciferol, fish oil, hydroCHLOROthiazide, lisinopril, metFORMIN, and metoprolol succinate XL    Allergies:  Allergies   Allergen Reactions   • Statins Other (See Comments)       Review of Systems  12 Point review of systems obtained, pertinent positives include change in vision, lethargy, weakness, right-sided eye pain, right-sided headache, photophobia.  Pertinent negatives include fevers, chills, sweats, dizziness, lightheadedness, nausea, vomiting, chest pain, palpitations, abdominal pain, dysuria, bowel incontinence, focal neurological weakness affecting arms or legs.    Objective   Objective     Vitals:   Temp:  [97.8 °F (36.6 °C)] 97.8 °F (36.6 °C)  Heart Rate:  [70-87] 84  Resp:  [17] 17  BP: (140-183)/(62-87) 142/68    Physical Exam    Constitutional: Awake, alert, no acute distress   Eyes: Pupils equal, sclerae anicteric, no conjunctival injection, blurry vision   HENT: NCAT, mucous membranes moist,   Neck: Supple, no thyromegaly, no lymphadenopathy, trachea midline   Respiratory: Clear to auscultation bilaterally, nonlabored respirations    Cardiovascular: RRR, no murmurs, rubs, or gallops, palpable pedal pulses bilaterally   Gastrointestinal: Positive bowel sounds, soft, nontender, nondistended   Musculoskeletal: No bilateral ankle edema, no clubbing or cyanosis to extremities   Psychiatric: Appropriate affect, cooperative   Neurologic: Oriented x 3, strength symmetric in all extremities, Cranial Nerves grossly intact to confrontation, speech clear   Skin: No rashes     Result Review    Result Review:  I have personally reviewed the results from the time of this admission to 10/18/2021 13:23 EDT and agree with these findings:  [x]  Laboratory  [x]  Microbiology  [x]  Radiology  [x]  EKG/Telemetry   []  Cardiology/Vascular   []  Pathology  [x]  Old records  []  Other:      Assessment/Plan   Assessment / Plan     Assessment/Plan:   Assessment:  Acute hemorrhagic stroke  Intraparenchymal  hematomas in the posterior right cerebral hemisphere; occipital, temporal,  Subarachnoid hemorrhage bilateral parietal lobes, inferior right temporal  Hypertensive crisis  Essential hypertension; uncontrolled  Diabetes mellitus  Dyslipidemia    Plan:  Labs imaging reviewed  Admit to critical care unit  Cleviprex to maintain blood pressure systolic less than 140s  Neurochecks per protocol  Stroke protocol  No anticoagulation  High intensity statin  CTA head reviewed  MRI brain pending  Echocardiogram pending  Neurology consulted Dr. Guadarrama, follow-up recommendations  Neurosurgery consulted from emergency room Dr. Pablo, follow-up recommendations  Stat head CT with any change in mentation  Sliding-scale insulin coverage  Insulin sliding scale coverage  N.p.o.  PT/OT  Speech evaluation  Diet per speech  A.m. labs  VTE prophylaxis SCDs  Clinical course to dictate further management  Discussed with nurse at the bedside, ER Dr. Taylor, patient's  and daughter at the bedside      DVT prophylaxis:  No DVT prophylaxis order currently exists.    CODE STATUS:    Code Status: CPR  Medical Interventions (Level of Support Prior to Arrest): Full      Admission Status:  I believe this patient meets inpatient status.    Electronically signed by Morenita Lovett MD, 10/18/21, 1:23 PM EDT.

## 2021-10-18 NOTE — CONSULTS
[facility]   HISTORY AND PHYSICAL    Patient Name: Kimber Carranza  : 1950  MRN: [id number]  Primary Care Physician:  Kelsie Maier APRN  Date of admission: 10/18/2021  ICU LOS: Patient does not have an ICU stay during this admission.      Subjective   Subjective     Chief Complaint: Hemorrhagic stroke    HPI:    Kimber Carranza is a 71 y.o. female with past medical history significant for hypertension who presented to the ED today complaining of brain fog and difficulty concentrating ongoing for the last 1-2 days.  Patient denies doing anything specific when symptom onset occurred.  Upon arrival to the ED, /81.  Work-up in the ED revealed head CT without contrast showing intraparenchymal hemorrhage right occipital lobe and right temporal lobe with subarachnoid hemorrhage seen within bilateral parietal lobes and inferior right temporal lobe.  Neurosurgery was consulted and did not recommend surgical interventions at this time.  Hospital service contacted for admission.  Patient was admitted to ICU and our service was consulted for further evaluation critical care management.  Patient was started on Cleviprex drip while in the ED with target blood pressure 140 or less, remains on Cleviprex.    Review of Systems  Unable to fully obtain secondary patient status of being optimally responsive/confused    Does complain of brain fog and having difficulty finding her words    Personal History     Past Medical History:   Diagnosis Date   • Diabetes mellitus (HCC)    • Elevated cholesterol    • Essential hypertension 2021   • Palpitations 2021   • Stroke (HCC)        Past Surgical History:   Procedure Laterality Date   • HYSTERECTOMY         Family History: family history includes Hypertension in her father. Otherwise pertinent FHx was reviewed and not pertinent to current issue.    Social History:  reports that she has never smoked. She has never used smokeless tobacco. She reports  that she does not drink alcohol and does not use drugs.    Home Medications:  cholecalciferol, fish oil, hydroCHLOROthiazide, lisinopril, metFORMIN, and metoprolol succinate XL      Allergies:  Allergies   Allergen Reactions   • Statins GI Intolerance       Objective   Objective     Vitals:   Temp:  [97.8 °F (36.6 °C)-98 °F (36.7 °C)] 98 °F (36.7 °C)  Heart Rate:  [70-92] 89  Resp:  [13-21] 13  BP: (137-183)/(59-87) 137/59    Physical Exam  Vital Signs Reviewed  General: WDWN, Alert, NAD.    HEENT:  PERRL, EOMI. WITH LEFT-SIDED NEGLECT, mild right-sided facial droop, OP, nares clear  Neck:  Supple, no JVD, no thyromegaly  Chest:  good aeration, clear to auscultation bilaterally, no work of breathing noted  CV: RRR, no MGR, pulses 2+, equal.  Abd:  Soft, NT, ND, + BS, no HSM  Ext:  no clubbing, no cyanosis, no edema  Neuro:  A&Ox3, does appear to have difficulty with word finding, motor strength intact in lower extremities, RUE <LUE, sensation intact  Skin: No rashes or lesions noted      Result Review    Result Review:  I have personally reviewed the results from the time of this admission to 10/18/2021 17:31 EDT and agree with these findings:  [x]  Laboratory  []  Microbiology  [x]  Radiology  []  EKG/Telemetry   []  Cardiology/Vascular   []  Pathology  []  Old records  []  Other:    Assessment/Plan   Assessment / Plan     Active Hospital Problems:  Active Hospital Problems    Diagnosis    • Intraparenchymal hemorrhage of brain (HCC)      Plan:   Patient admitted by the hospitalist service to the ICU  Continue with frequent neuro checks.   Hold anticoagulation/blood thinning medications  Does not need reversal agents at this time  Aspiration precautions, keep head of bed elevated, seizure precautions  Monitor closely for any signs of worsening bleeding  N.p.o. till seen by speech therapy and cleared  Supplemental O2 if needed to keep sats greater than 90%  Patient will need repeat imaging of the head in 24  hours  Cleviprex for BP management.  Goal SBP less than 140.    SSI for glucose control  Neurosurgery consulted and following.  Appreciate imitations  PT/OT consult     DVT prophylaxis:  Mechanical DVT prophylaxis orders are present.    CODE STATUS:    Code Status: CPR  Medical Interventions (Level of Support Prior to Arrest): Full    Scribed for Dr. Elizondo by Kimber Johnson.       The patient is critically ill in the ICU with life-threatening right occipital intraparenchymal bleed      Bedside rounds were performed with nursing staff, respiratory therapy, pharmacy, nutritional services, social work. I have personally reviewed the chart, labs and any pertinent imaging available.  I have spent 45 minutes of critical care time, excluding procedures, in the care of this patient.    Electronically signed by Aravind Elizondo MD [facility] 20:47 EDT 10/18/21

## 2021-10-19 ENCOUNTER — APPOINTMENT (OUTPATIENT)
Dept: CT IMAGING | Facility: HOSPITAL | Age: 71
End: 2021-10-19

## 2021-10-19 ENCOUNTER — APPOINTMENT (OUTPATIENT)
Dept: CARDIOLOGY | Facility: HOSPITAL | Age: 71
End: 2021-10-19

## 2021-10-19 LAB
ALBUMIN SERPL-MCNC: 4 G/DL (ref 3.5–5.2)
ALP SERPL-CCNC: 54 U/L (ref 39–117)
ALT SERPL W P-5'-P-CCNC: 35 U/L (ref 1–33)
ANION GAP SERPL CALCULATED.3IONS-SCNC: 13 MMOL/L (ref 5–15)
AST SERPL-CCNC: 25 U/L (ref 1–32)
BASOPHILS # BLD AUTO: 0.04 10*3/MM3 (ref 0–0.2)
BASOPHILS NFR BLD AUTO: 0.4 % (ref 0–1.5)
BH CV ECHO MEAS - AO ROOT DIAM: 2.3 CM
BH CV ECHO MEAS - EF(MOD-BP): 70 %
BH CV ECHO MEAS - IVSD: 1 CM
BH CV ECHO MEAS - LA DIMENSION(2D): 3.3 CM
BH CV ECHO MEAS - LAT PEAK E' VEL: 5 CM/SEC
BH CV ECHO MEAS - LVIDD: 4.2 CM
BH CV ECHO MEAS - LVIDS: 2.7 CM
BH CV ECHO MEAS - LVPWD: 1 CM
BH CV ECHO MEAS - MED PEAK E' VEL: 6 CM/SEC
BH CV ECHO MEAS - MV A MAX VEL: 106 CM/SEC
BH CV ECHO MEAS - MV DEC TIME: 151 MSEC
BH CV ECHO MEAS - MV E MAX VEL: 80 CM/SEC
BH CV ECHO MEAS - MV E/A: 0.8
BH CV ECHO MEASUREMENTS AVERAGE E/E' RATIO: 14.55
BILIRUB CONJ SERPL-MCNC: 0.2 MG/DL (ref 0–0.3)
BILIRUB INDIRECT SERPL-MCNC: 0.3 MG/DL
BILIRUB SERPL-MCNC: 0.5 MG/DL (ref 0–1.2)
BUN SERPL-MCNC: 11 MG/DL (ref 8–23)
BUN/CREAT SERPL: 12 (ref 7–25)
CALCIUM SPEC-SCNC: 8.8 MG/DL (ref 8.6–10.5)
CHLORIDE SERPL-SCNC: 97 MMOL/L (ref 98–107)
CHOLEST SERPL-MCNC: 162 MG/DL (ref 0–200)
CO2 SERPL-SCNC: 26 MMOL/L (ref 22–29)
CREAT SERPL-MCNC: 0.92 MG/DL (ref 0.57–1)
DEPRECATED RDW RBC AUTO: 40.2 FL (ref 37–54)
EOSINOPHIL # BLD AUTO: 0.06 10*3/MM3 (ref 0–0.4)
EOSINOPHIL NFR BLD AUTO: 0.6 % (ref 0.3–6.2)
ERYTHROCYTE [DISTWIDTH] IN BLOOD BY AUTOMATED COUNT: 12.4 % (ref 12.3–15.4)
GFR SERPL CREATININE-BSD FRML MDRD: 60 ML/MIN/1.73
GLUCOSE BLDC GLUCOMTR-MCNC: 128 MG/DL (ref 70–99)
GLUCOSE BLDC GLUCOMTR-MCNC: 132 MG/DL (ref 70–99)
GLUCOSE BLDC GLUCOMTR-MCNC: 163 MG/DL (ref 70–99)
GLUCOSE BLDC GLUCOMTR-MCNC: 179 MG/DL (ref 70–99)
GLUCOSE BLDC GLUCOMTR-MCNC: 191 MG/DL (ref 70–99)
GLUCOSE SERPL-MCNC: 179 MG/DL (ref 65–99)
HBA1C MFR BLD: 7.3 % (ref 4.8–5.6)
HCT VFR BLD AUTO: 41.8 % (ref 34–46.6)
HDLC SERPL-MCNC: 38 MG/DL (ref 40–60)
HGB BLD-MCNC: 13.6 G/DL (ref 12–15.9)
IMM GRANULOCYTES # BLD AUTO: 0.03 10*3/MM3 (ref 0–0.05)
IMM GRANULOCYTES NFR BLD AUTO: 0.3 % (ref 0–0.5)
IVRT: 63 MSEC
LDLC SERPL CALC-MCNC: 90 MG/DL (ref 0–100)
LDLC/HDLC SERPL: 2.2 {RATIO}
LEFT ATRIUM VOLUME INDEX: 23 ML/M2
LYMPHOCYTES # BLD AUTO: 2.16 10*3/MM3 (ref 0.7–3.1)
LYMPHOCYTES NFR BLD AUTO: 21 % (ref 19.6–45.3)
MAGNESIUM SERPL-MCNC: 1.8 MG/DL (ref 1.6–2.4)
MAXIMAL PREDICTED HEART RATE: 147 BPM
MCH RBC QN AUTO: 28.8 PG (ref 26.6–33)
MCHC RBC AUTO-ENTMCNC: 32.5 G/DL (ref 31.5–35.7)
MCV RBC AUTO: 88.4 FL (ref 79–97)
MONOCYTES # BLD AUTO: 0.85 10*3/MM3 (ref 0.1–0.9)
MONOCYTES NFR BLD AUTO: 8.3 % (ref 5–12)
NEUTROPHILS NFR BLD AUTO: 69.4 % (ref 42.7–76)
NEUTROPHILS NFR BLD AUTO: 7.15 10*3/MM3 (ref 1.7–7)
NRBC BLD AUTO-RTO: 0 /100 WBC (ref 0–0.2)
PHOSPHATE SERPL-MCNC: 2.9 MG/DL (ref 2.5–4.5)
PLATELET # BLD AUTO: 186 10*3/MM3 (ref 140–450)
PMV BLD AUTO: 10.2 FL (ref 6–12)
POTASSIUM SERPL-SCNC: 3.7 MMOL/L (ref 3.5–5.2)
PROT SERPL-MCNC: 6.4 G/DL (ref 6–8.5)
RBC # BLD AUTO: 4.73 10*6/MM3 (ref 3.77–5.28)
SODIUM SERPL-SCNC: 136 MMOL/L (ref 136–145)
STRESS TARGET HR: 125 BPM
TRIGL SERPL-MCNC: 202 MG/DL (ref 0–150)
VLDLC SERPL-MCNC: 34 MG/DL (ref 5–40)
WBC # BLD AUTO: 10.29 10*3/MM3 (ref 3.4–10.8)

## 2021-10-19 PROCEDURE — 63710000001 INSULIN LISPRO (HUMAN) PER 5 UNITS: Performed by: PHYSICIAN ASSISTANT

## 2021-10-19 PROCEDURE — 80061 LIPID PANEL: CPT | Performed by: FAMILY MEDICINE

## 2021-10-19 PROCEDURE — 83036 HEMOGLOBIN GLYCOSYLATED A1C: CPT | Performed by: FAMILY MEDICINE

## 2021-10-19 PROCEDURE — 84100 ASSAY OF PHOSPHORUS: CPT | Performed by: FAMILY MEDICINE

## 2021-10-19 PROCEDURE — 99291 CRITICAL CARE FIRST HOUR: CPT | Performed by: INTERNAL MEDICINE

## 2021-10-19 PROCEDURE — 99221 1ST HOSP IP/OBS SF/LOW 40: CPT | Performed by: NEUROLOGICAL SURGERY

## 2021-10-19 PROCEDURE — 85025 COMPLETE CBC W/AUTO DIFF WBC: CPT | Performed by: FAMILY MEDICINE

## 2021-10-19 PROCEDURE — 82962 GLUCOSE BLOOD TEST: CPT

## 2021-10-19 PROCEDURE — 83735 ASSAY OF MAGNESIUM: CPT | Performed by: FAMILY MEDICINE

## 2021-10-19 PROCEDURE — 80076 HEPATIC FUNCTION PANEL: CPT | Performed by: FAMILY MEDICINE

## 2021-10-19 PROCEDURE — 93306 TTE W/DOPPLER COMPLETE: CPT

## 2021-10-19 PROCEDURE — 25010000002 MAGNESIUM SULFATE IN D5W 1G/100ML (PREMIX) 1-5 GM/100ML-% SOLUTION: Performed by: PHYSICIAN ASSISTANT

## 2021-10-19 PROCEDURE — 99233 SBSQ HOSP IP/OBS HIGH 50: CPT | Performed by: FAMILY MEDICINE

## 2021-10-19 PROCEDURE — 80048 BASIC METABOLIC PNL TOTAL CA: CPT | Performed by: FAMILY MEDICINE

## 2021-10-19 PROCEDURE — 70450 CT HEAD/BRAIN W/O DYE: CPT

## 2021-10-19 PROCEDURE — 25010000002 CLEVIDIPINE 50 MG/100ML EMULSION: Performed by: FAMILY MEDICINE

## 2021-10-19 PROCEDURE — 93306 TTE W/DOPPLER COMPLETE: CPT | Performed by: INTERNAL MEDICINE

## 2021-10-19 PROCEDURE — 92610 EVALUATE SWALLOWING FUNCTION: CPT

## 2021-10-19 PROCEDURE — C9248 INJ, CLEVIDIPINE BUTYRATE: HCPCS | Performed by: FAMILY MEDICINE

## 2021-10-19 RX ORDER — METOPROLOL SUCCINATE 25 MG/1
50 TABLET, EXTENDED RELEASE ORAL
Status: DISCONTINUED | OUTPATIENT
Start: 2021-10-19 | End: 2021-10-20

## 2021-10-19 RX ORDER — LISINOPRIL 20 MG/1
40 TABLET ORAL
Status: DISCONTINUED | OUTPATIENT
Start: 2021-10-20 | End: 2021-10-22 | Stop reason: HOSPADM

## 2021-10-19 RX ORDER — LABETALOL HYDROCHLORIDE 5 MG/ML
20 INJECTION, SOLUTION INTRAVENOUS EVERY 4 HOURS PRN
Status: DISCONTINUED | OUTPATIENT
Start: 2021-10-19 | End: 2021-10-22 | Stop reason: HOSPADM

## 2021-10-19 RX ORDER — ENALAPRILAT 2.5 MG/2ML
1.25 INJECTION INTRAVENOUS EVERY 6 HOURS PRN
Status: DISCONTINUED | OUTPATIENT
Start: 2021-10-19 | End: 2021-10-20

## 2021-10-19 RX ORDER — HYDRALAZINE HYDROCHLORIDE 20 MG/ML
20 INJECTION INTRAMUSCULAR; INTRAVENOUS EVERY 4 HOURS PRN
Status: DISCONTINUED | OUTPATIENT
Start: 2021-10-19 | End: 2021-10-22 | Stop reason: HOSPADM

## 2021-10-19 RX ORDER — LABETALOL HYDROCHLORIDE 5 MG/ML
10 INJECTION, SOLUTION INTRAVENOUS EVERY 4 HOURS PRN
Status: DISCONTINUED | OUTPATIENT
Start: 2021-10-19 | End: 2021-10-19

## 2021-10-19 RX ORDER — SODIUM CHLORIDE 9 MG/ML
75 INJECTION, SOLUTION INTRAVENOUS CONTINUOUS
Status: DISCONTINUED | OUTPATIENT
Start: 2021-10-19 | End: 2021-10-20

## 2021-10-19 RX ORDER — MAGNESIUM SULFATE 1 G/100ML
1 INJECTION INTRAVENOUS ONCE
Status: COMPLETED | OUTPATIENT
Start: 2021-10-19 | End: 2021-10-19

## 2021-10-19 RX ORDER — POTASSIUM CHLORIDE 750 MG/1
40 CAPSULE, EXTENDED RELEASE ORAL ONCE
Status: COMPLETED | OUTPATIENT
Start: 2021-10-19 | End: 2021-10-19

## 2021-10-19 RX ORDER — LISINOPRIL 10 MG/1
30 TABLET ORAL
Status: DISCONTINUED | OUTPATIENT
Start: 2021-10-19 | End: 2021-10-19

## 2021-10-19 RX ADMIN — LABETALOL 20 MG/4 ML (5 MG/ML) INTRAVENOUS SYRINGE 10 MG: at 16:03

## 2021-10-19 RX ADMIN — POTASSIUM CHLORIDE 40 MEQ: 750 CAPSULE, EXTENDED RELEASE ORAL at 11:56

## 2021-10-19 RX ADMIN — ACETAMINOPHEN 650 MG: 325 TABLET ORAL at 05:28

## 2021-10-19 RX ADMIN — METOPROLOL SUCCINATE 50 MG: 50 TABLET, EXTENDED RELEASE ORAL at 11:56

## 2021-10-19 RX ADMIN — CLEVIPIDINE 4 MG/HR: 0.5 EMULSION INTRAVENOUS at 09:07

## 2021-10-19 RX ADMIN — MAGNESIUM SULFATE 1 G: 1 INJECTION INTRAVENOUS at 11:56

## 2021-10-19 RX ADMIN — SODIUM CHLORIDE 75 ML/HR: 9 INJECTION, SOLUTION INTRAVENOUS at 11:55

## 2021-10-19 RX ADMIN — SODIUM CHLORIDE, PRESERVATIVE FREE 10 ML: 5 INJECTION INTRAVENOUS at 20:39

## 2021-10-19 RX ADMIN — INSULIN LISPRO 2 UNITS: 100 INJECTION, SOLUTION INTRAVENOUS; SUBCUTANEOUS at 06:30

## 2021-10-19 RX ADMIN — ACETAMINOPHEN 650 MG: 325 TABLET ORAL at 15:57

## 2021-10-19 RX ADMIN — LISINOPRIL 30 MG: 10 TABLET ORAL at 11:56

## 2021-10-19 RX ADMIN — CLEVIPIDINE 6 MG/HR: 0.5 EMULSION INTRAVENOUS at 00:33

## 2021-10-19 RX ADMIN — SODIUM CHLORIDE, PRESERVATIVE FREE 10 ML: 5 INJECTION INTRAVENOUS at 09:00

## 2021-10-19 NOTE — PLAN OF CARE
Goal Outcome Evaluation:      FUNCTIONAL ASSESSMENT INSTRUMENT: Patient currently scored a level 7 of 7 on Functional Communication Measures for swallowing indicating a 0% limitation in function.    ASSESSMENT/ PLAN OF CARE:  Pt presents with functional oropharyngeal swallow.  No clinical signs or symptoms of aspiration are noted.  Vocal quality remains clear to cervical sedation.    PROBLEMS:  1.N/A   LTG 1: N/A   STG 1a: N/A    FREQUENCY/DURATION: N/A    REHAB POTENTIAL:  Pt has good rehab potential.  The following limitations may influence improvement/ length of tx medical status.    RECOMMENDATIONS:   1.   DIET: Regular diet-thin liquids    2.  POSITION: 90 degrees upright for all intake    3.  COMPENSATORY STRATEGIES: General swallow precautions        YES: Pt/responsible party agrees with plan of care and has been informed of all alternatives, risks and benefits.      EDUCATION  The patient has been educated in the following areas:   Dysphagia (Swallowing Impairment).

## 2021-10-19 NOTE — CONSULTS
"Nutrition Services    Patient Name: Kimber Carranza  YOB: 1950  MRN: [id number]  Admission date: 10/18/2021      CLINICAL NUTRITION ASSESSMENT      Reason for Assessment  Diagnosis -stroke protocol  MST +2     H&P:    Past Medical History:   Diagnosis Date   • Diabetes mellitus (HCC)    • Elevated cholesterol    • Essential hypertension 7/28/2021   • Palpitations 7/28/2021   • Stroke (HCC)         Current Problems:   Active Hospital Problems    Diagnosis    • Intraparenchymal hemorrhage of brain (HCC)         Nutrition/Diet History         Narrative     SPL evaluation: regular diet, cut small. Thin liquids.  Pt asleep, did not touch breakfast. Daughter at bedside reports pt ate well last on Sunday and that she prefers soft foods and no spicy foods.   Cortrak orders received by d/c 2' diet started. Note labs Trig 202H and A1C 7.3H       Anthropometrics        Current Height, Weight Height: 160 cm (63\")  Weight: 85 kg (187 lb 6.3 oz)   Current BMI Body mass index is 33.19 kg/m².       Weight Hx  Wt Readings from Last 30 Encounters:   10/18/21 1035 85 kg (187 lb 6.3 oz)   07/28/21 1054 87.5 kg (193 lb)   03/23/21 0000 85.7 kg (189 lb)   08/25/20 0000 83.9 kg (185 lb)   11/18/19 0000 84.5 kg (186 lb 6 oz)   11/12/19 0000 83.9 kg (185 lb)   11/27/18 0000 87.1 kg (192 lb)   05/15/18 0000 87.5 kg (193 lb)            Wt Change Observation Wt range 84-87kg this past year     Estimated/Assessed Needs       Energy Requirements    EST Needs (kcal/day) 1720kcal       Protein Requirements    EST Daily Needs (g/day) 64g       Fluid Requirements     Estimated Needs (mL/day) 1720     Labs/Medications         Pertinent Labs Reviewed. Triglycerides 202H.    Results from last 7 days   Lab Units 10/19/21  0235 10/18/21  1306 10/18/21  1301   SODIUM mmol/L 136  --  136   POTASSIUM mmol/L 3.7  --  4.0   CHLORIDE mmol/L 97*  --  98   CO2 mmol/L 26.0  --  25.3   BUN mg/dL 11  --  14   CREATININE mg/dL 0.92 0.80 0.95 "   CALCIUM mg/dL 8.8  --  9.3   BILIRUBIN mg/dL 0.5  --  0.5   ALK PHOS U/L 54  --  56   ALT (SGPT) U/L 35*  --  43*   AST (SGOT) U/L 25  --  38*   GLUCOSE mg/dL 179*  --  177*     Results from last 7 days   Lab Units 10/19/21  0235   MAGNESIUM mg/dL 1.8   PHOSPHORUS mg/dL 2.9   HEMOGLOBIN g/dL 13.6   HEMATOCRIT % 41.8   TRIGLYCERIDES mg/dL 202*     No results found for: COVID19  Lab Results   Component Value Date    HGBA1C 7.30 (H) 10/19/2021         Pertinent Medications Reviewed.     Current Nutrition Orders & Evaluation of Intake       Oral Nutrition     Current PO Diet Diet Regular   Supplement No active supplement orders       Nutrition Diagnosis         Nutrition Dx Problem 1 Altered nutrition related lab values related to endocrine dysfunction as evidenced by Trig 202H/ A1C 7.3H       Nutrition Intervention         Gi soft  Carb consistent diet  Glucose boost glu control tid until intake > 50%     Medical Nutrition Therapy/Nutrition Education      Pt not ready for education    Monitor/Evaluation        Monitor PO intake, Supplement intake       Nutrition Discharge Plan         Carb consistent diet       Electronically signed by:  Kimber Davila RD  10/19/21 12:27 EDT

## 2021-10-19 NOTE — CONSULTS
[facility]   Neurosurgery Consult    Patient Name:Kimber Carranza  : 1950  MRN: [id number]  Primary Care Physician: Kelsie Maier APRN  Date of admission: 10/18/2021    Subjective   Subjective     Chief Complaint: Right intraparenchymal hemorrhage    History of Present Illness   Kimber Carranza is a 71 y.o. female who was found confused and brought to the ER. She was found to have a right temporal/occipital hemorrhage on CT. She is not on any blood thinners. She had some elevated BP and was placed on Cleviprex. She had an episode of decreased responsiveness and increased left sided weakness this morning and a repeat CT was performed which was stable. She had reported headache to her family, but there was no history of trauma.    Review of Systems   Neurological: Positive for weakness and headaches.   Psychiatric/Behavioral: Positive for confusion.         Personal History     Past Medical History:   Diagnosis Date   • Diabetes mellitus (HCC)    • Elevated cholesterol    • Essential hypertension 2021   • Palpitations 2021   • Stroke (HCC)        Past Surgical History:   Procedure Laterality Date   • HYSTERECTOMY         Family History: Her family history includes Hypertension in her father.     Social History: She  reports that she has never smoked. She has never used smokeless tobacco. She reports that she does not drink alcohol and does not use drugs.    Home Medications:  cholecalciferol, fish oil, hydroCHLOROthiazide, lisinopril, metFORMIN, and metoprolol succinate XL    Allergies:  She is allergic to statins.    Objective    Objective     Vitals:    Temp:  [98 °F (36.7 °C)] 98 °F (36.7 °C)  Heart Rate:  [] 77  Resp:  [13-21] 18  BP: ()/() 139/67    Physical Exam  Pulmonary:      Effort: Pulmonary effort is normal.   Neurological:      Mental Status: She is alert.      Motor: No weakness.      Comments: She is oriented to name/place   Psychiatric:         Mood  and Affect: Mood normal.          Result Review    Result Review:  I have personally reviewed the results from the time of this admission to 10/19/2021 14:09 EDT and agree with these findings:  []  Laboratory  []  Microbiology  [x]  Radiology  []  EKG/Telemetry   []  Cardiology/Vascular   []  Pathology  []  Old records  []  Other:  Most notable findings include: CT shows a large right temporal/occipital hemorrhage with small amount of SAH.    Assessment/Plan   Assessment / Plan     Brief Patient Summary:  Kimber Carranza is a 71 y.o. female with intracranial hemorrhage. Possibly hypertensive vs amyloid angiopathy.    Active Hospital Problems:  Active Hospital Problems    Diagnosis    • Intraparenchymal hemorrhage of brain (HCC)      Plan:   Continue BP control with goal SBP of less than 140. Once able to transition off cleviprex, could TTF. Will need inpatient rehab at [place].    DVT prophylaxis:  Mechanical DVT prophylaxis orders are present.

## 2021-10-19 NOTE — PROGRESS NOTES
[facility]   Hospitalist Progress Note  Date: 10/19/2021  Patient Name: Kimber Carranza  : 1950  MRN: [id number]  Date of admission: 10/18/2021      Subjective   Subjective     Admitted: 10/18/2021  Chief complaint: Altered mental status  Consultants: Dr. Pablo-neurosurgery, Dr. Guadarrama-neurology, Dr. Elizondo-critical care  Summary:  71-year-old female with history of essential hypertension, diabetes mellitus, mixed hyperlipidemia, presented to the emergency room with chief complaint of altered mental status.  Found to have hypertensive crisis, head CT revealed intraparenchymal hemorrhage in the right occipital lobe, right temporal lobe, with subarachnoid hemorrhage within both parietal lobes and inferior right temporal lobe.  Neurosurgery was consulted, hospital service requested to admit and further manage.  MRI brain showing similar findings to head CT, repeat head CT showing associated mass-effect effaces in the right ventricular atrium and occipital horn, echo obtained, left ventricular diastolic dysfunction.    Interval follow-up: Patient seen and examined this morning, patient tolerated bedside, all questions answered, no acute events overnight, earlier this morning she did have increased lethargy and unresponsiveness, stat CT scan was obtained, with no acute major changes when compared to previous, she continues to have symptoms of generalized weakness, headache, confusion, photophobia, her left leg was weak intermittently, telemetry reviewed few.  Her systolic blood pressures are above 160 [place] overall, several SPO2 alarms, no acute saved arrhythmic events, baseline sinus rhythm in the 70s.    Review of Systems:  All systems were reviewed and negative except for weakness, headache, confusion, photophobia.    Objective   Objective     Vitals:   Heart Rate:  [] 77  Resp:  [18] 18  BP: ()/() 139/67  Physical Exam    Constitutional: Sleeping, arousable, no acute distress               Eyes: Pupils equal, sclerae anicteric, no conjunctival injection              HENT: NCAT, mucous membranes moist,              Neck: Supple, full range of motion              Respiratory: Clear to auscultation bilaterally, nonlabored respirations               Cardiovascular: RRR, no murmurs, rubs, or gallops, palpable pedal pulses bilaterally              Gastrointestinal: Positive bowel sounds, soft, nontender, nondistended              Musculoskeletal: No bilateral ankle edema, no clubbing or cyanosis to extremities              Psychiatric: Appropriate affect, cooperative              Neurologic: Oriented x 3, strength symmetric in all extremities, very slight weakness left lower leg, Cranial Nerves grossly intact to confrontation, speech clear              Skin: No rashes     Result Review    Result Review:  I have personally reviewed the results from the time of this admission to 10/19/2021 17:07 EDT and agree with these findings:  [x]  Laboratory  [x]  Microbiology  [x]  Radiology  [x]  EKG/Telemetry   []  Cardiology/Vascular   []  Pathology  [x]  Old records  []  Other:    Assessment/Plan   Assessment / Plan     Assessment/Plan:  Assessment:  Acute hemorrhagic stroke  Intraparenchymal hematomas in the posterior right cerebral hemisphere; occipital, temporal,  Subarachnoid hemorrhage bilateral parietal lobes, inferior right temporal  Hypertensive crisis  Essential hypertension; uncontrolled  Diabetes mellitus  Dyslipidemia     Plan:  Labs imaging reviewed  Still working on achieving stability of her blood pressures  Continue critical care unit monitoring while she is on Cleviprex  Cleviprex to maintain blood pressure systolic less than 140s wean as her blood pressure improves  Started on lisinopril 30 mg daily, metoprolol XL 50 mg daily  May need additional agent for blood pressure control  Neurochecks per protocol  Stroke protocol  No anticoagulation  High intensity statin continue  MRI brain  reviewed  Echocardiogram reviewed  Neurology consulted Dr. Guadarrama, follow-up recommendations  Neurosurgery consulted Dr. Pablo, recommendations appreciated  Stat head CT with any change in mentation  Sliding-scale insulin coverage  Insulin sliding scale coverage  Diet per speech  PT/OT continue  A.m. labs  VTE prophylaxis SCDs  Clinical course to dictate further management  Discussed with nurse at the bedside, discussed with patient's daughter at bedside, all questions answered.    DVT prophylaxis:  Mechanical DVT prophylaxis orders are present.    CODE STATUS:   Code Status: CPR  Medical Interventions (Level of Support Prior to Arrest): Full        Electronically signed by Morenita Lovett MD, 10/19/21, 5:07 PM EDT.

## 2021-10-19 NOTE — PROGRESS NOTES
Pulmonary / Critical Care  Progress Note      Patient Name: Kimber Carranza  : 1950  MRN: [id number]  Primary Care Physician:  Kelsie Maier, [facility]  Date of admission: 10/18/2021  ICU: Patient does not have an ICU stay during this admission.      Subjective   Subjective        72y/o female critically ill in CCU with hemorrhagic stroke     Date of Admission: 10/18/2021   ICU overflow 3 W. day: 2  Oxygen requirement: Nasal cannula  Sedation: None  Pressors: None  Critical drips: Cleviprex  Antibiotic regimen: None  Lines and insertion date: Peripherals,     This morning.  Patient had repeat CT scan performed this morning secondary to worsening symptoms  NIH score went from 3-11 and patient came poorly responsive but recovered in a short period of time.   Per report, CT scan unchanged from prior  Neurosurgery notified  Speech evaluated patient and cleared for diet  Sodium 136  Continues on Cleviprex drip at this time  Overnight RN did not give labetalol due to concerns for BP       ROS  General: Fatigue, otherwise denied complaints  HEENT: Denied complaints  Respiratory: Denied complaints  Cardiovascular: Denied complaints  GI: Denied complaints  MSK: Generalized weakness, otherwise denied complaints  Hematologic: Denied complaints  Neurologic: Difficulty with word finding, otherwise denied complaints  Skin: Denied complaints      Objective   Objective     Vitals:   Temp:  [98 °F (36.7 °C)] 98 °F (36.7 °C)  Heart Rate:  [] 77  Resp:  [13-21] 18  BP: ()/() 139/67    Physical Exam: vital Signs Reviewed  General: Elderly female, Alert, NAD.    HEENT:  PERRL, EOMI. WITH LEFT-SIDED NEGLECT, mild right-sided facial droop, OP, nares clear  Neck:  Supple, no JVD, no thyromegaly  Chest:  good aeration, clear to auscultation bilaterally, no work of breathing noted  CV: RRR, no MGR, pulses 2+, equal.  Abd:  Soft, NT, ND, + BS, no HSM  Ext:  no clubbing, no cyanosis, no edema  Neuro:  A&Ox3, does  appear to have difficulty with word finding, motor strength intact in lower extremities, RUE <LUE, sensation intact  Skin: No rashes or lesions noted      Result Review    Result Review:  I have personally reviewed the results from the time of this admission to 10/19/2021 12:14 EDT and agree with these findings:  [x]  Laboratory  []  Microbiology  [x]  Radiology  []  EKG/Telemetry   []  Cardiology/Vascular   []  Pathology  []  Old records  []  Other:    Assessment/Plan   Assessment / Plan     Active Hospital Problems:  Active Hospital Problems    Diagnosis    • Intraparenchymal hemorrhage of brain (HCC)      Plan:   Continue frequent neurochecks  Continue holding anticoagulation/blood thinning medications  Aspiration precautions, seizure precautions, keep head of bed elevated  Monitor closely for any signs of worsening bleeding  Diet per speech recommendations  Supplemental O2 as needed to keep O2 saturations greater than 90%  Cleviprex for BP management at this time  We will restart patient's home BP medications in hopes to wean off Cleviprex  Goal SBP less than 140.  Keep labetalol as needed  Starting normal saline IV fluids to help raise serum sodium and help with cerebral edema  Trend renal panel, LFTs and electrolytes  We will replace electrolytes as needed  SSI  PT/OT  Will follow pt next 12-24 hours to make sure bp is well controled.       DVT prophylaxis:  Mechanical DVT prophylaxis orders are present.    CODE STATUS:   Code Status: CPR  Medical Interventions (Level of Support Prior to Arrest): Full    The above plan is a product of multidisciplinary rounds performed this morning on 10/19/2021 at bedside with the critical care team consisting of the on-call physician, Dr. Elizondo,  and myself Kimber Soto PA-C, bedside RN, pharmacist, respiratory therapist, dietitian and other Allied health services.    Dictated utilizing Dragon Dictation.    Scribed for Dr. Elizondo by Kimber Johnson.      The patient is critically  ill in the ICU with life-threatening right intra parenhymal bleed due to hypertension requiring IV blood pressure medications.      Bedside rounds were performed with nursing staff, respiratory therapy, pharmacy, nutritional services, social work. I have personally reviewed the chart, labs and any pertinent imaging available.  I have spent 45 minutes of critical care time, excluding procedures, in the care of this patient.    Electronically signed by Aravind [facility]ondo [facility] 18[facility]04 EDT 10/19/21

## 2021-10-19 NOTE — THERAPY EVALUATION
Acute Care - Speech Language Pathology   Swallow Initial Evaluation JACKELINE Edouard     Patient Name: Kimber Carranza  : 1950  MRN: [id number]  Today's Date: 10/19/2021               Admit Date: 10/18/2021    Visit Dx:     ICD-10-CM ICD-9-CM   1. Intraparenchymal hemorrhage of brain (HCC)  I61.9 431   2. Subarachnoid hemorrhage (HCC)  I60.9 430   3. Primary hypertension  I10 401.9     Patient Active Problem List   Diagnosis   • Essential hypertension   • Palpitations   • Intraparenchymal hemorrhage of brain (HCC)     Past Medical History:   Diagnosis Date   • Diabetes mellitus (HCC)    • Elevated cholesterol    • Essential hypertension 2021   • Palpitations 2021   • Stroke (HCC)      Past Surgical History:   Procedure Laterality Date   • HYSTERECTOMY       PAIN SCALE: None reported    PRECAUTIONS/CONTRAINDICATIONS: None noted    SUSPECTED ABUSE/NEGLECT/EXPLOITATION: None noted    SOCIAL/PSYCHOLOGICAL NEEDS/BARRIERS: None noted    PAST SOCIAL HISTORY: Lives at home with family    PRIOR FUNCTION: Independent    PATIENT GOALS/EXPECTATIONS: Return home    HISTORY: This patient is a very pleasant 71-year-old admitted with right occipital hemorrhage right temporal subarachnoid hemorrhage bilaterally in the parietal.   Patient passed dysphagia screen.    CURRENT DIET LEVEL: Regular diet    OBJECTIVE:    TEST ADMINISTERED: Clinical dysphagia evaluation    COGNITION/SAFETY AWARENESS: Alert and oriented    BEHAVIORAL OBSERVATIONS: Pleasant and cooperative    ORAL MOTOR EXAM: Lingual and labial strength and range of motion appear to be within functional limits    VOICE QUALITY: Within normal limits    REFLEX EXAM: Deferred    POSTURE: 90 degrees upright    FEEDING/SWALLOWING FUNCTION: Assessed with thin liquids from spoon cup and straw, purée consistencies soft and hard solids.    CLINICAL OBSERVATIONS: Oral stage is characterized by good bolus preparation and control.  Timely oral transit.  Pharyngeal phase  appears timely with good laryngeal elevation per palpation.  No clinical signs or symptoms of aspiration were noted.  Vocal quality remained clear to cervical auscultation.  Denies globus sensation after completion of swallow.    DYSPHAGIA CRITERIA: N/A    FUNCTIONAL ASSESSMENT INSTRUMENT: Patient currently scored a level 7 of 7 on Functional Communication Measures for swallowing indicating a 0% limitation in function.    ASSESSMENT/ PLAN OF CARE:  Pt presents with functional oropharyngeal swallow.  No clinical signs or symptoms of aspiration are noted.  Vocal quality remains clear to cervical sedation.    PROBLEMS:  1.N/A   LTG 1: N/A   STG 1a: N/A    FREQUENCY/DURATION: N/A    REHAB POTENTIAL:  Pt has good rehab potential.  The following limitations may influence improvement/ length of tx medical status.    RECOMMENDATIONS:   1.   DIET: Regular diet-thin liquids    2.  POSITION: 90 degrees upright for all intake    3.  COMPENSATORY STRATEGIES: General swallow precautions        YES: Pt/responsible party agrees with plan of care and has been informed of all alternatives, risks and benefits.      EDUCATION  The patient has been educated in the following areas:   Dysphagia (Swallowing Impairment).          Megan Chino, SLP  10/19/2021

## 2021-10-20 LAB
ALBUMIN SERPL-MCNC: 3.6 G/DL (ref 3.5–5.2)
ALP SERPL-CCNC: 46 U/L (ref 39–117)
ALT SERPL W P-5'-P-CCNC: 25 U/L (ref 1–33)
ANION GAP SERPL CALCULATED.3IONS-SCNC: 10.4 MMOL/L (ref 5–15)
AST SERPL-CCNC: 20 U/L (ref 1–32)
BASOPHILS # BLD AUTO: 0.03 10*3/MM3 (ref 0–0.2)
BASOPHILS NFR BLD AUTO: 0.3 % (ref 0–1.5)
BILIRUB CONJ SERPL-MCNC: 0.2 MG/DL (ref 0–0.3)
BILIRUB INDIRECT SERPL-MCNC: 0.5 MG/DL
BILIRUB SERPL-MCNC: 0.7 MG/DL (ref 0–1.2)
BUN SERPL-MCNC: 10 MG/DL (ref 8–23)
BUN/CREAT SERPL: 10.4 (ref 7–25)
CALCIUM SPEC-SCNC: 8.7 MG/DL (ref 8.6–10.5)
CHLORIDE SERPL-SCNC: 99 MMOL/L (ref 98–107)
CO2 SERPL-SCNC: 24.6 MMOL/L (ref 22–29)
CREAT SERPL-MCNC: 0.96 MG/DL (ref 0.57–1)
DEPRECATED RDW RBC AUTO: 40.1 FL (ref 37–54)
EOSINOPHIL # BLD AUTO: 0.08 10*3/MM3 (ref 0–0.4)
EOSINOPHIL NFR BLD AUTO: 0.8 % (ref 0.3–6.2)
ERYTHROCYTE [DISTWIDTH] IN BLOOD BY AUTOMATED COUNT: 12.5 % (ref 12.3–15.4)
GFR SERPL CREATININE-BSD FRML MDRD: 57 ML/MIN/1.73
GLUCOSE BLDC GLUCOMTR-MCNC: 124 MG/DL (ref 70–99)
GLUCOSE BLDC GLUCOMTR-MCNC: 130 MG/DL (ref 70–99)
GLUCOSE BLDC GLUCOMTR-MCNC: 132 MG/DL (ref 70–99)
GLUCOSE BLDC GLUCOMTR-MCNC: 139 MG/DL (ref 70–99)
GLUCOSE BLDC GLUCOMTR-MCNC: 140 MG/DL (ref 70–99)
GLUCOSE BLDC GLUCOMTR-MCNC: 155 MG/DL (ref 70–99)
GLUCOSE SERPL-MCNC: 140 MG/DL (ref 65–99)
HCT VFR BLD AUTO: 39 % (ref 34–46.6)
HGB BLD-MCNC: 12.7 G/DL (ref 12–15.9)
IMM GRANULOCYTES # BLD AUTO: 0.03 10*3/MM3 (ref 0–0.05)
IMM GRANULOCYTES NFR BLD AUTO: 0.3 % (ref 0–0.5)
LYMPHOCYTES # BLD AUTO: 2.49 10*3/MM3 (ref 0.7–3.1)
LYMPHOCYTES NFR BLD AUTO: 23.6 % (ref 19.6–45.3)
MAGNESIUM SERPL-MCNC: 2.1 MG/DL (ref 1.6–2.4)
MCH RBC QN AUTO: 28.3 PG (ref 26.6–33)
MCHC RBC AUTO-ENTMCNC: 32.6 G/DL (ref 31.5–35.7)
MCV RBC AUTO: 87.1 FL (ref 79–97)
MONOCYTES # BLD AUTO: 0.92 10*3/MM3 (ref 0.1–0.9)
MONOCYTES NFR BLD AUTO: 8.7 % (ref 5–12)
NEUTROPHILS NFR BLD AUTO: 6.98 10*3/MM3 (ref 1.7–7)
NEUTROPHILS NFR BLD AUTO: 66.3 % (ref 42.7–76)
NRBC BLD AUTO-RTO: 0 /100 WBC (ref 0–0.2)
PHOSPHATE SERPL-MCNC: 3 MG/DL (ref 2.5–4.5)
PLATELET # BLD AUTO: 195 10*3/MM3 (ref 140–450)
PMV BLD AUTO: 10 FL (ref 6–12)
POTASSIUM SERPL-SCNC: 4.2 MMOL/L (ref 3.5–5.2)
PROT SERPL-MCNC: 6.2 G/DL (ref 6–8.5)
RBC # BLD AUTO: 4.48 10*6/MM3 (ref 3.77–5.28)
SODIUM SERPL-SCNC: 134 MMOL/L (ref 136–145)
WBC # BLD AUTO: 10.53 10*3/MM3 (ref 3.4–10.8)

## 2021-10-20 PROCEDURE — 83735 ASSAY OF MAGNESIUM: CPT | Performed by: FAMILY MEDICINE

## 2021-10-20 PROCEDURE — 63710000001 INSULIN LISPRO (HUMAN) PER 5 UNITS: Performed by: PHYSICIAN ASSISTANT

## 2021-10-20 PROCEDURE — 97165 OT EVAL LOW COMPLEX 30 MIN: CPT

## 2021-10-20 PROCEDURE — 99233 SBSQ HOSP IP/OBS HIGH 50: CPT | Performed by: FAMILY MEDICINE

## 2021-10-20 PROCEDURE — 82962 GLUCOSE BLOOD TEST: CPT

## 2021-10-20 PROCEDURE — 80076 HEPATIC FUNCTION PANEL: CPT | Performed by: FAMILY MEDICINE

## 2021-10-20 PROCEDURE — 97161 PT EVAL LOW COMPLEX 20 MIN: CPT

## 2021-10-20 PROCEDURE — 97530 THERAPEUTIC ACTIVITIES: CPT

## 2021-10-20 PROCEDURE — 99232 SBSQ HOSP IP/OBS MODERATE 35: CPT | Performed by: NEUROLOGICAL SURGERY

## 2021-10-20 PROCEDURE — 97535 SELF CARE MNGMENT TRAINING: CPT

## 2021-10-20 PROCEDURE — 92522 EVALUATE SPEECH PRODUCTION: CPT

## 2021-10-20 PROCEDURE — 84100 ASSAY OF PHOSPHORUS: CPT | Performed by: FAMILY MEDICINE

## 2021-10-20 PROCEDURE — 80048 BASIC METABOLIC PNL TOTAL CA: CPT | Performed by: FAMILY MEDICINE

## 2021-10-20 PROCEDURE — 85025 COMPLETE CBC W/AUTO DIFF WBC: CPT | Performed by: FAMILY MEDICINE

## 2021-10-20 RX ORDER — METOPROLOL SUCCINATE 50 MG/1
50 TABLET, EXTENDED RELEASE ORAL EVERY 12 HOURS SCHEDULED
Status: DISCONTINUED | OUTPATIENT
Start: 2021-10-20 | End: 2021-10-22 | Stop reason: HOSPADM

## 2021-10-20 RX ADMIN — INSULIN LISPRO 2 UNITS: 100 INJECTION, SOLUTION INTRAVENOUS; SUBCUTANEOUS at 08:13

## 2021-10-20 RX ADMIN — LISINOPRIL 40 MG: 20 TABLET ORAL at 08:45

## 2021-10-20 RX ADMIN — METOPROLOL SUCCINATE 50 MG: 50 TABLET, EXTENDED RELEASE ORAL at 08:07

## 2021-10-20 RX ADMIN — SODIUM CHLORIDE 75 ML/HR: 9 INJECTION, SOLUTION INTRAVENOUS at 06:39

## 2021-10-20 RX ADMIN — METOPROLOL SUCCINATE 50 MG: 50 TABLET, EXTENDED RELEASE ORAL at 20:19

## 2021-10-20 RX ADMIN — SODIUM CHLORIDE, PRESERVATIVE FREE 10 ML: 5 INJECTION INTRAVENOUS at 20:19

## 2021-10-20 RX ADMIN — ACETAMINOPHEN 650 MG: 325 TABLET ORAL at 08:07

## 2021-10-20 RX ADMIN — LABETALOL HYDROCHLORIDE 20 MG: 5 INJECTION INTRAVENOUS at 07:29

## 2021-10-20 NOTE — PLAN OF CARE
"Goal Outcome Evaluation:   Pt remain stable throughout shift. Systolic BP remained under 140 without the use of any medication. Pt states that she \"has slept very well tonight.\" Vital signs are stable.              "

## 2021-10-20 NOTE — THERAPY EVALUATION
Acute Care - Physical Therapy Initial Evaluation  JACKELINE Edouard     Patient Name: Kimber Carranza  : 1950  MRN: [id number]  Today's Date: 10/20/2021      Visit Dx:     ICD-10-CM ICD-9-CM   1. Intraparenchymal hemorrhage of brain (HCC)  I61.9 431   2. Subarachnoid hemorrhage (HCC)  I60.9 430   3. Primary hypertension  I10 401.9   4. Decreased activities of daily living (ADL)  Z78.9 V49.89   5. Difficulty walking  R26.2 719.7     Patient Active Problem List   Diagnosis   • Essential hypertension   • Palpitations   • Intraparenchymal hemorrhage of brain (HCC)     Past Medical History:   Diagnosis Date   • Diabetes mellitus (HCC)    • Elevated cholesterol    • Essential hypertension 2021   • Palpitations 2021   • Stroke (HCC)      Past Surgical History:   Procedure Laterality Date   • HYSTERECTOMY       PT Assessment (last 12 hours)     PT Evaluation and Treatment     Row Name 10/20/21 1400          Physical Therapy Time and Intention    Subjective Information complains of; fatigue; weakness  -DP     Document Type evaluation  -DP     Mode of Treatment individual therapy; physical therapy  -DP     Row Name 10/20/21 1400          General Information    Patient Profile Reviewed yes  -DP     Patient Observations cooperative; agree to therapy  -DP     General Observations of Patient Patient has left visual field deficit.  Patient reports that she is able to see clearly on the right side with her right eye but anything in midline is seen double and she is not able to see anything in her left visual field if she keeps her head straight.  When educated to turn her head to left and use the right eye visual field to see, patient reported that she is seeing double.  When left eye was closed patient was able to see better and 1 image with the right eye visual field.  Patient also presents with decreased acknowledgment of left side and has difficulty processing information provided from the therapist when she was on  the left side.  The same cues and information when provided from the right side of the patient was better processed.  -DP     Prior Level of Function independent:; gait; transfer; ADL's; bed mobility  -DP     Equipment Currently Used at Home none  -DP     Existing Precautions/Restrictions fall  -DP     Row Name 10/20/21 1400          Living Environment    Current Living Arrangements home/apartment/condo  -DP     Home Accessibility stairs to enter home  -DP     Lives With spouse  -DP     Row Name 10/20/21 1400          Cognition    Follows Commands (Cognition) delayed response/completion; increased processing time needed  -DP     Row Name 10/20/21 1400          Range of Motion (ROM)    Range of Motion bilateral lower extremities; ROM is WFL  -DP     Row Name 10/20/21 1400          Strength (Manual Muscle Testing)    Strength (Manual Muscle Testing) bilateral lower extremities  4+/5  -DP     Row Name 10/20/21 1400          Bed Mobility    Bed Mobility supine-sit-supine  -DP     Supine-Sit-Supine [place] (Bed Mobility) contact guard; minimum assist (75% patient effort)  -DP     Row Name 10/20/21 1400          Transfers    Transfers sit-stand transfer  -DP     Sit-Stand [place] (Transfers) contact guard  -DP     Row Name 10/20/21 1400          Gait/Stairs (Locomotion)    [place] Level (Gait) contact guard  -DP     Assistive Device (Gait) walker, front-wheeled  -DP     Distance in Feet (Gait) 100  -DP     Row Name 10/20/21 1400          Balance    Balance Assessment standing dynamic balance  -DP     Dynamic Standing Balance mild impairment  -DP     Row Name 10/20/21 1400          Plan of Care Review    Plan of Care Reviewed With patient  -DP     Outcome Summary Patient presents with balance, functional mobility deficits.  She will benefit from skilled physical therapy services in the hospital and placement in a rehab hospital.  -DP     Row Name 10/20/21 1400          Physical Therapy Goals    Bed Mobility  Goal Selection (PT) bed mobility, PT goal 1  -DP     Transfer Goal Selection (PT) transfer, PT goal 1  -DP     Gait Training Goal Selection (PT) gait training, PT goal 1  -DP     Row Name 10/20/21 1400          Bed Mobility Goal 1 (PT)    Activity/Assistive Device (Bed Mobility Goal 1, PT) sit to supine/supine to sit  -DP     [place] Level/Cues Needed (Bed Mobility Goal 1, PT) standby assist  -DP     Time Frame (Bed Mobility Goal 1, PT) 10 days  -DP     Row Name 10/20/21 1400          Transfer Goal 1 (PT)    Activity/Assistive Device (Transfer Goal 1, PT) sit-to-stand/stand-to-sit  -DP     [place] Level/Cues Needed (Transfer Goal 1, PT) standby assist  -DP     Time Frame (Transfer Goal 1, PT) 10 days  -DP     Row Name 10/20/21 1400          Gait Training Goal 1 (PT)    Activity/Assistive Device (Gait Training Goal 1, PT) assistive device use; walker, rolling  -DP     [place] Level (Gait Training Goal 1, PT) standby assist  -DP     Distance (Gait Training Goal 1, PT) 200  -DP     Time Frame (Gait Training Goal 1, PT) 10 days  -DP     Row Name 10/20/21 1400          Therapy Assessment/Plan (PT)    Rehab Potential (PT) good, to achieve stated therapy goals  -DP     Criteria for Skilled Interventions Met (PT) yes; skilled treatment is necessary  -DP     Predicted Duration of Therapy Intervention (PT) 10 days  -DP     Problem List (PT) problems related to; balance; mobility; strength  -DP     Row Name 10/20/21 1400          PT Evaluation Complexity    History, PT Evaluation Complexity no personal factors and/or comorbidities  -DP     Examination of Body Systems (PT Eval Complexity) total of 4 or more elements  -DP     Clinical Presentation (PT Evaluation Complexity) evolving  -DP     Clinical Decision Making (PT Evaluation Complexity) low complexity  -DP     Overall Complexity (PT Evaluation Complexity) low complexity  -DP           User Key  (r) = Recorded By, (t) = Taken By, (c) = Cosigned By    Initials  Name Provider Type    DP Arianna Perez PT Physical Therapist              Physical Therapy Education                 Title: PT OT SLP Therapies (In Progress)     Topic: Physical Therapy (Done)     Point: Mobility training (Done)     Learning Progress Summary           Patient Acceptance, E,TB, VU by JAIMEE at 10/20/2021 1505                   Point: Home exercise program (Done)     Learning Progress Summary           Patient Acceptance, E,TB, VU by DP at 10/20/2021 1505                   Point: Body mechanics (Done)     Learning Progress Summary           Patient Acceptance, E,TB, VU by JAIMEE at 10/20/2021 1505                   Point: Precautions (Done)     Learning Progress Summary           Patient Acceptance, E,TB, VU by JAIMEE at 10/20/2021 1505                               User Key     Initials Effective Dates Name Provider Type Discipline    DP 06/03/21 -  Arianna Perez PT Physical Therapist PT              PT Recommendation and Plan  Anticipated Discharge Disposition (PT): inpatient rehabilitation facility, sub acute care setting  Planned Therapy Interventions (PT): balance training, bed mobility training, gait training, strengthening, transfer training  Therapy Frequency (PT): daily  Plan of Care Reviewed With: patient  Outcome Summary: Patient presents with balance, functional mobility deficits.  She will benefit from skilled physical therapy services in the hospital and placement in a rehab hospital.   Outcome Measures     Row Name 10/20/21 1500             How much help from another person do you currently need...    Turning from your back to your side while in flat bed without using bedrails? 3  -DP      Moving from lying on back to sitting on the side of a flat bed without bedrails? 3  -DP      Moving to and from a bed to a chair (including a wheelchair)? 3  -DP      Standing up from a chair using your arms (e.g., wheelchair, bedside chair)? 3  -DP      Climbing 3-5 steps with a railing? 2  -DP      To  walk in hospital room? 3  -DP      AM-PAC 6 Clicks Score (PT) 17  -DP              Functional Assessment    Outcome Measure Options AM-PAC 6 Clicks Basic Mobility (PT)  -DP            User Key  (r) = Recorded By, (t) = Taken By, (c) = Cosigned By    Initials Name Provider Type    Arianna Galindo, PT Physical Therapist                 Time Calculation:    PT Charges     Row Name 10/20/21 1505             Time Calculation    PT Received On 10/20/21  -DP      PT Goal Re-Cert Due Date 10/29/21  -DP              Untimed Charges    PT Eval/Re-eval Minutes 45  -DP              Total Minutes    Untimed Charges Total Minutes 45  -DP       Total Minutes 45  -DP            User Key  (r) = Recorded By, (t) = Taken By, (c) = Cosigned By    Initials Name Provider Type    Arianna Galindo, PT Physical Therapist              Therapy Charges for Today     Code Description Service Date Service Provider Modifiers Qty    55151704179 HC PT EVAL LOW COMPLEXITY 3 10/20/2021 Arianna Perez, PT GP 1          PT G-Codes  Outcome Measure Options: AM-PAC 6 Clicks Basic Mobility (PT)  AM-PAC 6 Clicks Score (PT): 17  AM-PAC 6 Clicks Score (OT): 11    Arianna Perez PT  10/20/2021

## 2021-10-20 NOTE — THERAPY EVALUATION
Patient Name: Kimber Carranza  : 1950    MRN: [id number]                              Today's Date: 10/20/2021       Admit Date: 10/18/2021    Visit Dx:     ICD-10-CM ICD-9-CM   1. Intraparenchymal hemorrhage of brain (HCC)  I61.9 431   2. Subarachnoid hemorrhage (HCC)  I60.9 430   3. Primary hypertension  I10 401.9   4. Decreased activities of daily living (ADL)  Z78.9 V49.89     Patient Active Problem List   Diagnosis   • Essential hypertension   • Palpitations   • Intraparenchymal hemorrhage of brain (HCC)     Past Medical History:   Diagnosis Date   • Diabetes mellitus (HCC)    • Elevated cholesterol    • Essential hypertension 2021   • Palpitations 2021   • Stroke (HCC)      Past Surgical History:   Procedure Laterality Date   • HYSTERECTOMY        General Information     Row Name 10/20/21 0922 10/20/21 0909       OT Time and Intention    Document Type therapy note (daily note)  -PG evaluation  -PG    Mode of Treatment individual therapy; occupational therapy  -PG occupational therapy  -PG    Row Name 10/20/21 0909          General Information    Patient Profile Reviewed yes  -PG     Prior Level of Function independent:; driving; transfer; ADL's  -PG     Existing Precautions/Restrictions fall  -PG     Barriers to Rehab none identified  -PG     Row Name 10/20/21 0909          Occupational Profile    Reason for Services/Referral (Occupational Profile) ADL performanceCreased ADL performance decreasedDecreased ADL performance decreased ADL performance  -PG     Row Name 10/20/21 0909          Living Environment    Lives With spouse  -PG     Row Name 10/20/21 0909          Cognition    Orientation Status (Cognition) oriented x 3  -PG     Row Name 10/20/21 0909          Safety Issues, Functional Mobility    Safety Issues Affecting Function (Mobility) ability to follow commands; awareness of need for assistance  -PG     Impairments Affecting Function (Mobility) balance; endurance/activity tolerance;  strength; sensation/sensory awareness; visual/perceptual  -PG           User Key  (r) = Recorded By, (t) = Taken By, (c) = Cosigned By    Initials Name Provider Type    PG Ramone Gurrola OT Occupational Therapist                 Mobility/ADL's     Row Name 10/20/21 0922 10/20/21 0911       Transfers    Transfers -- bed-chair transfer; toilet transfer  -PG    Bed-Chair [place] (Transfers) contact guard; minimum assist (75% patient effort); verbal cues  -PG contact guard; minimum assist (75% patient effort); verbal cues  -PG    [place] Level (Toilet Transfer) contact guard; verbal cues; minimum assist (75% patient effort)  -PG contact guard; verbal cues; minimum assist (75% patient effort)  -PG    Assistive Device (Toilet Transfer) commode, 3-in-1  -PG commode, 3-in-1  -PG    Row Name 10/20/21 0922 10/20/21 0911       Toilet Transfer    Type (Toilet Transfer) sit-stand  -PG sit-stand  -PG    Row Name 10/20/21 0922 10/20/21 0911       Activities of Daily Living    BADL Assessment/Intervention toileting  -PG --  Maximal assist/dependent with all bathing, dressing and toileting activities  -PG    Row Name 10/20/21 0922          Toileting Assessment/Training    [place] Level (Toileting) toileting skills; perform perineal hygiene; maximum assist (25% patient effort)  -PG     Position (Toileting) unsupported sitting  -PG           User Key  (r) = Recorded By, (t) = Taken By, (c) = Cosigned By    Initials Name Provider Type    PG Ramone Gurrola OT Occupational Therapist               Obj/Interventions     Row Name 10/20/21 0913          Sensory Assessment (Somatosensory)    Sensory Assessment (Somatosensory) sensation intact  -PG     Row Name 10/20/21 0913          Vision Assessment/Intervention    Visual Motor Impairment visual tracking, left  -PG     Visual Processing Deficit visual attention, left; visual search, left  -PG     Row Name 10/20/21 0913          Range of Motion Comprehensive    General Range of  Motion no range of motion deficits identified  -PG     Row Name 10/20/21 0913          Strength Comprehensive (MMT)    Comment, General Manual Muscle Testing (MMT) Assessment Right upper extremity 5/5, left upper extremity 4/5  -PG     Row Name 10/20/21 0913          Motor Skills    Motor Skills coordination; functional endurance  -PG     Coordination WFL  -PG     Functional Endurance Fair minus/fair  -PG           User Key  (r) = Recorded By, (t) = Taken By, (c) = Cosigned By    Initials Name Provider Type    PG Ramone Gurrola OT Occupational Therapist               Goals/Plan     Row Name 10/20/21 0917          Transfer Goal 1 (OT)    Activity/Assistive Device (Transfer Goal 1, OT) transfers, all  -PG     [place] Level/Cues Needed (Transfer Goal 1, OT) standby assist  -PG     Time Frame (Transfer Goal 1, OT) long term goal (LTG); 10 days  -PG     Row Name 10/20/21 0917          Bathing Goal 1 (OT)    Activity/Device (Bathing Goal 1, OT) bathing skills, all  -PG     [place] Level/Cues Needed (Bathing Goal 1, OT) minimum assist (75% or more patient effort)  -PG     Time Frame (Bathing Goal 1, OT) long term goal (LTG); 10 days  -PG     Row Name 10/20/21 0917          Dressing Goal 1 (OT)    Activity/Device (Dressing Goal 1, OT) dressing skills, all  -PG     [place]/Cues Needed (Dressing Goal 1, OT) minimum assist (75% or more patient effort)  -PG     Time Frame (Dressing Goal 1, OT) 10 days; long term goal (LTG)  -PG     Row Name 10/20/21 0917          Toileting Goal 1 (OT)    Activity/Device (Toileting Goal 1, OT) toileting skills, all  -PG     [place] Level/Cues Needed (Toileting Goal 1, OT) minimum assist (75% or more patient effort)  -PG     Time Frame (Toileting Goal 1, OT) long term goal (LTG); 10 days  -PG     Row Name 10/20/21 0917          Grooming Goal 1 (OT)    Activity/Device (Grooming Goal 1, OT) grooming skills, all  -PG     [place] (Grooming Goal 1, OT) minimum assist (75% or  more patient effort)  -PG     Time Frame (Grooming Goal 1, OT) long term goal (LTG); 10 days  -PG     Row Name 10/20/21 0917          Problem Specific Goal 1 (OT)    Problem Specific Goal 1 (OT) Patient will attend to the left side 50% of the time with minimal verbal cues during dressing and bathing activities  -PG     Time Frame (Problem Specific Goal 1, OT) long term goal (LTG); 10 days  -PG     Row Name 10/20/21 0917          Therapy Assessment/Plan (OT)    Planned Therapy Interventions (OT) activity tolerance training; BADL retraining; occupation/activity based interventions; patient/caregiver education/training; strengthening exercise; transfer/mobility retraining; cognitive/visual perception retraining  -PG           User Key  (r) = Recorded By, (t) = Taken By, (c) = Cosigned By    Initials Name Provider Type    PG Ramone Gurrola, OT Occupational Therapist               Clinical Impression     Row Name 10/20/21 0917          Pain Assessment    Additional Documentation Pain Scale: Numbers Pre/Post-Treatment (Group)  -PG     Row Name 10/20/21 0917          Pain Scale: Numbers Pre/Post-Treatment    Pretreatment Pain Rating 0/10 - no pain  -PG     Posttreatment Pain Rating 0/10 - no pain  -PG     Row Name 10/20/21 0917          Plan of Care Review    Plan of Care Reviewed With patient  -PG     Progress no change  -PG     Outcome Summary Patient presents with limitations affecting strength, activity tolerance, and balance impacting patient's ability to return home safely and independently.  The skills of a therapist will be required to safely and effectively implement the following treatment plan to restore maximal level of function  -PG     Row Name 10/20/21 0917          Therapy Assessment/Plan (OT)    Patient/Family Therapy Goal Statement (OT) Return home independently  -PG     Rehab Potential (OT) good, to achieve stated therapy goals  -PG     Criteria for Skilled Therapeutic Interventions Met (OT) yes; meets  criteria; skilled treatment is necessary  -PG     Therapy Frequency (OT) 5 times/wk  -PG     Row Name 10/20/21 0917          Therapy Plan Review/Discharge Plan (OT)    Anticipated Discharge Disposition (OT) inpatient rehabilitation facility  -PG           User Key  (r) = Recorded By, (t) = Taken By, (c) = Cosigned By    Initials Name Provider Type    PG Ramone Gurrola, OT Occupational Therapist               Outcome Measures     Row Name 10/20/21 0921          How much help from another is currently needed...    Putting on and taking off regular lower body clothing? 1  -PG     Bathing (including washing, rinsing, and drying) 1  -PG     Toileting (which includes using toilet bed pan or urinal) 2  -PG     Putting on and taking off regular upper body clothing 2  -PG     Taking care of personal grooming (such as brushing teeth) 2  -PG     Eating meals 3  -PG     AM-PAC 6 Clicks Score (OT) 11  -PG     Row Name 10/20/21 0730          How much help from another person do you currently need...    Turning from your back to your side while in flat bed without using bedrails? 4  -JK     Moving from lying on back to sitting on the side of a flat bed without bedrails? 4  -JK     Moving to and from a bed to a chair (including a wheelchair)? 3  -JK     Standing up from a chair using your arms (e.g., wheelchair, bedside chair)? 3  -JK     Climbing 3-5 steps with a railing? 3  -JK     To walk in hospital room? 3  -JK     AM-PAC 6 Clicks Score (PT) 20  -JK     Row Name 10/20/21 0921          Functional Assessment    Outcome Measure Options Optimal Instrument; AM-PAC 6 Clicks Daily Activity (OT)  -PG     Row Name 10/20/21 0921          Optimal Instrument    Optimal Instrument Optimal - 3  -PG     Bending/Stooping 4  -PG     Standing 2  -PG     Reaching 2  -PG     From the list, choose the 3 activities you would most like to be able to do without any difficulty Bending/stooping; Standing; Reaching  -PG     Total Score Optimal - 3 8  -PG            User Key  (r) = Recorded By, (t) = Taken By, (c) = Cosigned By    Initials Name Provider Type    Tenzin Manning, RN Registered Nurse    PG Ramone Gurrola OT Occupational Therapist                Occupational Therapy Education                 Title: PT OT SLP Therapies (In Progress)     Topic: Occupational Therapy (In Progress)     Point: ADL training (In Progress)     Description:   Instruct learner(s) on proper safety adaptation and remediation techniques during self care or transfers.   Instruct in proper use of assistive devices.              Learning Progress Summary           Patient Acceptance, E,D, NR by PG at 10/20/2021 0922                   Point: Home exercise program (In Progress)     Description:   Instruct learner(s) on appropriate technique for monitoring, assisting and/or progressing therapeutic exercises/activities.              Learning Progress Summary           Patient Acceptance, E,D, NR by PG at 10/20/2021 0922                   Point: Precautions (In Progress)     Description:   Instruct learner(s) on prescribed precautions during self-care and functional transfers.              Learning Progress Summary           Patient Acceptance, E,D, NR by PG at 10/20/2021 0922                   Point: Body mechanics (In Progress)     Description:   Instruct learner(s) on proper positioning and spine alignment during self-care, functional mobility activities and/or exercises.              Learning Progress Summary           Patient Acceptance, E,D, NR by PG at 10/20/2021 0922                               User Key     Initials Effective Dates Name Provider Type Discipline     06/16/21 -  Ramone Gurrola OT Occupational Therapist OT              OT Recommendation and Plan  Planned Therapy Interventions (OT): activity tolerance training, BADL retraining, occupation/activity based interventions, patient/caregiver education/training, strengthening exercise, transfer/mobility retraining,  cognitive/visual perception retraining  Therapy Frequency (OT): 5 times/wk  Plan of Care Review  Plan of Care Reviewed With: patient  Progress: no change  Outcome Summary: Patient presents with limitations affecting strength, activity tolerance, and balance impacting patient's ability to return home safely and independently.  The skills of a therapist will be required to safely and effectively implement the following treatment plan to restore maximal level of function     Time Calculation:    Time Calculation- OT     Row Name 10/20/21 0924             Time Calculation- OT    OT Received On 10/20/21  -PG      OT Goal Re-Cert Due Date 10/29/21  -PG              Timed Charges    69046 - OT Therapeutic Activity Minutes 15  -PG      15650 - OT Self Care/Mgmt Minutes 8  -PG              Total Minutes    Timed Charges Total Minutes 23  -PG       Total Minutes 23  -PG            User Key  (r) = Recorded By, (t) = Taken By, (c) = Cosigned By    Initials Name Provider Type    PG Ramone Gurrola OT Occupational Therapist              Therapy Charges for Today     Code Description Service Date Service Provider Modifiers Qty    28659705713 HC OT EVAL LOW COMPLEXITY 2 10/20/2021 Ramone Gurrola OT GO 1    90159075207 HC OT SELF CARE/MGMT/TRAIN EA 15 MIN 10/20/2021 Ramone Gurrola OT GO 1    97776461694 HC OT THERAPEUTIC ACT EA 15 MIN 10/20/2021 Ramone Gurrola OT GO 1               Ramone Gurrola OT  10/20/2021

## 2021-10-20 NOTE — THERAPY EVALUATION
Acute Care - Speech Language Pathology Initial Evaluation  JACKELINE Edouard     Patient Name: Kimber Carranza  : 1950  MRN: [id number]  Today's Date: 10/20/2021               Admit Date: 10/18/2021     Visit Dx:    ICD-10-CM ICD-9-CM   1. Intraparenchymal hemorrhage of brain (HCC)  I61.9 431   2. Subarachnoid hemorrhage (HCC)  I60.9 430   3. Primary hypertension  I10 401.9   4. Decreased activities of daily living (ADL)  Z78.9 V49.89     Patient Active Problem List   Diagnosis   • Essential hypertension   • Palpitations   • Intraparenchymal hemorrhage of brain (HCC)     Past Medical History:   Diagnosis Date   • Diabetes mellitus (HCC)    • Elevated cholesterol    • Essential hypertension 2021   • Palpitations 2021   • Stroke (HCC)      Past Surgical History:   Procedure Laterality Date   • HYSTERECTOMY       PAIN SCALE: None reported    PRECAUTIONS/CONTRAINDICATIONS: None noted    SUSPECTED ABUSE/NEGLECT/EXPLOITATION: None noted    SOCIAL/PSYCHOLOGICAL NEEDS/BARRIERS: None noted    PAST SOCIAL HISTORY: Lives at home with family    PRIOR FUNCTION: Independent    PATIENT GOALS/EXPECTATIONS: Return home    HISTORY: This patient is a very pleasant 71-year-old admitted with right occipital hemorrhage right temporal subarachnoid hemorrhage bilaterally in the parietal.   Initially struggled with word finding however this appears to have improved today.     CURRENT DIET LEVEL: Regular diet        TEST ADMINISTERED:  Speech/language evaluation  Auditory comprehension:  Intact for single step commands and general knowledge questions but diminished to 70% acc for multistep commands with response delay noted.   Confrontational/responsive naming:  Intact  Serial/automatic speech: intact  Sentence generation:  Intact however marked with reduced turn taking, absent eye contact, and tangential at times with difficulty with topic maintenance.   Attention:  Impaired.  Response delays noted throughout, redirection  required at times, sustained attention for 3 min   Left Neglect:  Patient does not attend past midline, required max cues.  Motor Speech:  Some mild left labial droop noted but functional ROM.  Speech intelligibility is good with 100% intelligibility at conversation level.       FUNCTIONAL ASSESSMENT INSTRUMENT: Patient currently scored a level 4 of 7 on Functional Communication Measures for attention indicating a 40-59% limitation in function.    ASSESSMENT/ PLAN OF CARE:  Pt presents with limitations, noted below, that impede her ability to communicate effectively. The skills of a therapist will be required to safely and effectively implement the following treatment plan to restore maximal level of function.    PROBLEMS:  1.  Left neglect, reduced attention and auditory comprehension   LTG 1: (30 days) Patient will improve cognitive communication skills for effective communication and improve FCM for attention to 6 of 7.    STG 1a: (14 days) Patient will increase attention to task for 5 min per target.   STG 1b: (14 days) Patient will improve thought organization tasks to 90% acc/target   STG 1c: (14 days) Patient will attend to left with use of visual prompts with min cues.    STG 1d: (14 days) Further assess reading/writing comprehension and higher level cognitive function.   STG 1e: (14 days) Patient increase auditory comprehension for multistep commands to 90% acc.    TREATMENT: Speech therapy to improve cognitive communication skills.       FREQUENCY/DURATION: QD - 5 times per week.     REHAB POTENTIAL:  Pt has good rehab potential.  The following limitations may influence improvement/ length of tx  Medical status.    YES: Pt/responsible party agrees with plan of care and has been informed of all alternatives, risks and benefits.      EDUCATION  The patient has been educated in the following areas:   Communication strategies.   .           Megan Chino, SLP  10/20/2021

## 2021-10-20 NOTE — CONSULTS
[facility]   Consult Note      Patient Name: Kimber Carranza  : 1950  MRN: [id number]  Primary Care Physician:  Kelsie Maier APRN  Date of admission: 10/18/2021    Subjective   Subjective     This 71 years old woman was seen upon request of Dr. Lovett for evaluation.    Chief Complaint:   Stroke    HPI:  The information was obtained from her and her  who dated the onset of her illness sometime in the morning of 10/18/2021 when she was noted to be confused patient stated to make coffee but she could not.  She corrected up to 40 copy but she did not.  She went to get an work-up and work-up I did not know what she was trying to do.  She mentioned that she called her granddaughter who was a next-door neighbor but could not get hold of her.  Thyroid, the  called their daughter who advised her to be taken to the hospital where she had a CAT scan of the brain which was reported to have shown focal areas of intraparenchymal hemorrhage within the right occipital lobe and right temporal lobe with subarachnoid hemorrhages in both parietal lobes and inferior right temporal lobe.  She was then admitted to the intensive care unit where she remained stable.  She did have a CT angiogram of the brain which showed no evidence of large vessel occlusion, focal high-grade stenosis, dissection or aneurysm.      Review of Systems  She remembered having a nondescript headache H on the right temporoparietal region which extended to the right face and eye and to the back of her head on the right side.  She does not verbally have been any numbness or weakness.      Past Medical History:   Diagnosis Date   • Diabetes mellitus (HCC)    • Elevated cholesterol    • Essential hypertension 2021   • Palpitations 2021   • Stroke (HCC)        Past Surgical History:   Procedure Laterality Date   • HYSTERECTOMY         Family History: family history includes Hypertension in her father. Otherwise  pertinent FHx was reviewed and not pertinent to current issue.    Social History:  reports that she has never smoked. She has never used smokeless tobacco. She reports that she does not drink alcohol and does not use drugs.    Psychosocial History: Not known    Home Medications:  cholecalciferol, fish oil, hydroCHLOROthiazide, lisinopril, metFORMIN, and metoprolol succinate XL      Allergies:  Allergies   Allergen Reactions   • Statins GI Intolerance       Vitals:   Heart Rate:  [] 72  BP: ()/() 124/85  Physical Exam   She was awake and alert was not informed distress.  She was fairly developed and fairly nourished.  The abdomen is slightly protuberant.    Her heart was regular.  The heart rate was 70/min.  No murmurs.  Her lungs were clear.    The carotid pulses were 1+ and equal.  There were no bruit on either side.    Neurological Examination:   Her responses were coherent and relevant.  She does not seem to be aware of what is going on around her.  She seemed to have difficulty in understanding spoken words.  She had trouble understanding and following verbal commands.      I did not look into the fundus on either side because of the COVID-19 pandemic social distancing.    The pupils were 3 to 4 mm there were round and equal reactive to light directly and consensually.  There were no extraocular muscle weakness.    She has a mild left lower facial weakness was able to hear normal conversational speech.    The strength of the sternomastoid and trapezius muscles were normal and symmetrical.  The uvula and the tongue were in the midline.    He has 5% left hemiparesis.    She felt pinprick less on the left forehead left face left lower jaw as well as left upper and lower extremities.    The deep tendon reflexes were absent on both sides.    She did finger-to-nose test available and equal in both sides.      Result Review    Result Review:  I have personally reviewed the results from the time of  this admission to 10/19/2021 22:01 EDT and agree with these findings:  [x]  Laboratory  []  Microbiology  [x]  Radiology  []  EKG/Telemetry   []  Cardiology/Vascular   []  Pathology  []  Old records  []  Other:  Most notable findings include:     I reviewed the computer images of the MRI of her brain was done on 10/18/2021.  This showed the same intraparenchymal hemorrhage in the right temporal lobe parietal lobe and occipital lobes.  There was a punctate hemorrhages noted on both sides more on the right.      I reviewed the computer images of the CAT scan of her brain that was done on October 18, 2021.  This showed a large intraparenchymal hemorrhage affecting the temporal parietal and occipital lobes.  The subarachnoid hemorrhage is noted in both parieto-occipital regions, and to some extent the right frontal region.  There is effacement of the right hemisphere.  There were area of lucency around the hemorrhagic process and in the left parieto-occipital lobe suggestive of cerebral edema.    Also reviewed the computer images of the CTA of the cerebral vessels which showed no evidence of any arteriovenous malformation, aneurysm, stenosis of the cerebral vessels.  There is no change in the intraparenchymal hemorrhage on the right temporoparietal occipital lobe.    We have the report of the transthoracic echocardiogram that was done on 10/18/2021.  This showed left ventricular diastolic function consistent with grade 1 impaired relaxation.  The estimated left ventricular ejection fraction was 70%.  No hemodynamically significant valvular pathology.    Impression:    Intracerebral hemorrhage, right temporal parietal occipital lobe  Hypertension  Diabetes mellitus  Obesity with a body mass index of 33.19        Plan:   Continue present treatment.  I agree with rehabilitation.                    Electronically signed by Mykel Guadarrama Jr., MD, 10/19/21, 10:01 PM EDT.

## 2021-10-20 NOTE — PLAN OF CARE
Goal Outcome Evaluation:  Plan of Care Reviewed With: patient           Outcome Summary: Patient presents with balance, functional mobility deficits.  She will benefit from skilled physical therapy services in the hospital and placement in a rehab hospital.

## 2021-10-20 NOTE — PROGRESS NOTES
[facility]   Neurosurgery Progress Note    Patient Name: Kimber Carranza  : 1950  MRN: [id number]  Date of admission: 10/18/2021  Surgical Procedures Since Admission:    Subjective   Subjective     Chief Complaint: Intracranial hemorrhage    History of Present Illness   No new complaints.  She has been getting up some light therapy and doing better.  Headache is improved with ice pack.  No additional episodes of left-sided weakness.    Objective   Objective     Vitals:   Temp:  [98.6 °F (37 °C)-99.1 °F (37.3 °C)] 98.6 °F (37 °C)  Heart Rate:  [62-80] 67  BP: (112-154)/() 136/53    She is awake, alert and oriented.  She has good strength of the left side.  Her speech is fluent.  Blood pressure is well controlled and off IV medication.      Assessment/Plan   Assessment / Plan     Brief Patient Summary:  Kimber Carranza is a 71 y.o. female who has a right occipital/temporal hemorrhage.  Stable on follow-up imaging and neuro exam stable.    Active Hospital Problems:  Active Hospital Problems    Diagnosis    • Intraparenchymal hemorrhage of brain (HCC)      Plan:   From a neurosurgical standpoint, okay to transfer from ICU once medically stable.  No edition no imaging necessary in the short-term unless she has a new neurologic issue.

## 2021-10-20 NOTE — SIGNIFICANT NOTE
10/20/21 1100   Coping/Psychosocial   Observed Emotional State quiet   Family/Support Persons son   Involvement in Care no interaction with patient   Family/Support System Care support provided   Additional Documentation Spiritual Care (Group)   Spiritual Care   Use of Spiritual Resources non-Orthodoxy use of spiritual care   Spiritual Care Source  initiative   Spiritual Care Follow-Up follow-up planned regularly for general support   Spiritual Care Visit Type initial   Spiritual Care Request family support

## 2021-10-20 NOTE — PROGRESS NOTES
[facility]   Hospitalist Progress Note  Date: 10/20/2021  Patient Name: Kimber Carranza  : 1950  MRN: [id number]  Date of admission: 10/18/2021      Subjective   Subjective     Admitted: 10/18/2021  Chief complaint: Altered mental status  Consultants: Dr. Pablo-neurosurgery, Dr. Guadarrama-neurology, Dr. Elizondo-critical care  Summary:  71-year-old female with history of essential hypertension, diabetes mellitus, mixed hyperlipidemia, presented to the emergency room with chief complaint of altered mental status.  Found to have hypertensive crisis, head CT revealed intraparenchymal hemorrhage in the right occipital lobe, right temporal lobe, with subarachnoid hemorrhage within both parietal lobes and inferior right temporal lobe.  Neurosurgery was consulted, hospital service requested to admit and further manage.  MRI brain showing similar findings to head CT, repeat head CT showing associated mass-effect effaces in the right ventricular atrium and occipital horn, echo obtained, left ventricular diastolic dysfunction.    Interval follow-up: Patient seen and examined, no acute distress, no acute major overnight events, patient has been tapered off of Cleviprex, she is doing better with her mentation, her son is at the bedside, she is alert and awake and answering questions, she has trouble finding words at times, overall strength is getting better, she continues to have headaches which improved with ice packs, she has difficulty understanding certain verbal cues, she still has difficulty seeing things laterally on her left side, I reviewed her telemetry monitor for the past 24 hours, she has had several SPO2 alarms I believe she probably has some underlying sleep apnea, no acute safety arrhythmic events.  She remains sinus bradycardia in the 50s.  Her sodium stable at 134, creatinine 0.96, hemoglobin 12.7.  She did require labetalol overnight.     Review of Systems:  All systems were reviewed and negative  except for visual changes out of her left lateral side, some left-sided neglect, right-sided facial droop, difficulty understanding words, difficulty finding the right words to say    Objective   Objective     Vitals:   Temp:  [98.6 °F (37 °C)-99.1 °F (37.3 °C)] 98.6 °F (37 °C)  Heart Rate:  [56-80] 56  Resp:  [16-22] 22  BP: (106-154)/() 144/77  Physical Exam    Constitutional: Awake, alert, ice pack to the right side of her head, answering questions, having word finding difficulty, no acute distress              Eyes: Pupils equal, sclerae anicteric, no conjunctival injection              HENT: NCAT, mucous membranes moist, EOMI, difficulty seeing out of her left eye laterally              Neck: Supple, full range of motion              Respiratory: Clear to auscultation bilaterally, nonlabored respirations               Cardiovascular: RRR, no murmurs, rubs, or gallops, palpable pedal pulses bilaterally              Gastrointestinal: Positive bowel sounds, soft, nontender, nondistended              Musculoskeletal: No bilateral ankle edema, no clubbing or cyanosis to extremities              Psychiatric: Appropriate affect, cooperative              Neurologic: Oriented x 3, strength symmetric in all extremities, very slight weakness left lower leg, word finding difficulty, stuttering words, cranial Nerves grossly intact to confrontation, speech clear, left-sided neglect              Skin: No rashes       Result Review    Result Review:  I have personally reviewed the results from the time of this admission to 10/20/2021 16:38 EDT and agree with these findings:  [x]  Laboratory  [x]  Microbiology  [x]  Radiology  [x]  EKG/Telemetry   []  Cardiology/Vascular   []  Pathology  [x]  Old records  []  Other:    Assessment/Plan   Assessment / Plan     Assessment/Plan:  Assessment:  Acute hemorrhagic stroke  Intraparenchymal hematomas in the posterior right cerebral hemisphere; occipital, temporal,  Subarachnoid  hemorrhage bilateral parietal lobes, inferior right temporal  Hypertensive crisis  Essential hypertension; uncontrolled  Diabetes mellitus  Dyslipidemia     Plan:  Labs imaging reviewed  Metoprolol succinate XL dose doubled 50 mg twice daily  Closely follow her heart rate and blood pressures  Transfer out of ICU to telemetry floor  Maintain systolic blood pressures less than 140  Lisinopril dose increased to 40 mg daily  Neurochecks per protocol  Stroke protocol  No anticoagulation, no antiplatelet  High intensity statin continue  Neurology consulted Dr. Guadarrama, discussed with him at the bedside with the patient and the patient's son  Neurosurgery consulted Dr. Pablo, recommendations appreciated  Stat head CT with any change in mentation  Sliding-scale insulin coverage  Insulin sliding scale coverage  Diet per speech  PT/OT continue  A.m. labs  VTE prophylaxis SCDs  Clinical course to dictate further management  Discussed with nurse at the bedside, discussed with patient's son at bedside, all questions answered.      DVT prophylaxis:  Mechanical DVT prophylaxis orders are present.    CODE STATUS:   Code Status: CPR  Medical Interventions (Level of Support Prior to Arrest): Full      Electronically signed by Morenita Lovett MD, 10/20/21, 4:38 PM EDT.

## 2021-10-21 LAB
ALBUMIN SERPL-MCNC: 3.5 G/DL (ref 3.5–5.2)
ALP SERPL-CCNC: 45 U/L (ref 39–117)
ALT SERPL W P-5'-P-CCNC: 19 U/L (ref 1–33)
ANION GAP SERPL CALCULATED.3IONS-SCNC: 13.8 MMOL/L (ref 5–15)
AST SERPL-CCNC: 16 U/L (ref 1–32)
BASOPHILS # BLD AUTO: 0.04 10*3/MM3 (ref 0–0.2)
BASOPHILS NFR BLD AUTO: 0.5 % (ref 0–1.5)
BILIRUB CONJ SERPL-MCNC: 0.2 MG/DL (ref 0–0.3)
BILIRUB INDIRECT SERPL-MCNC: 0.3 MG/DL
BILIRUB SERPL-MCNC: 0.5 MG/DL (ref 0–1.2)
BUN SERPL-MCNC: 12 MG/DL (ref 8–23)
BUN/CREAT SERPL: 13.8 (ref 7–25)
CALCIUM SPEC-SCNC: 8.6 MG/DL (ref 8.6–10.5)
CHLORIDE SERPL-SCNC: 103 MMOL/L (ref 98–107)
CO2 SERPL-SCNC: 19.2 MMOL/L (ref 22–29)
CREAT SERPL-MCNC: 0.87 MG/DL (ref 0.57–1)
DEPRECATED RDW RBC AUTO: 41.1 FL (ref 37–54)
EOSINOPHIL # BLD AUTO: 0.12 10*3/MM3 (ref 0–0.4)
EOSINOPHIL NFR BLD AUTO: 1.5 % (ref 0.3–6.2)
ERYTHROCYTE [DISTWIDTH] IN BLOOD BY AUTOMATED COUNT: 12.6 % (ref 12.3–15.4)
GFR SERPL CREATININE-BSD FRML MDRD: 64 ML/MIN/1.73
GLUCOSE BLDC GLUCOMTR-MCNC: 115 MG/DL (ref 70–99)
GLUCOSE BLDC GLUCOMTR-MCNC: 125 MG/DL (ref 70–99)
GLUCOSE BLDC GLUCOMTR-MCNC: 125 MG/DL (ref 70–99)
GLUCOSE BLDC GLUCOMTR-MCNC: 89 MG/DL (ref 70–99)
GLUCOSE SERPL-MCNC: 120 MG/DL (ref 65–99)
HCT VFR BLD AUTO: 37.5 % (ref 34–46.6)
HGB BLD-MCNC: 12.1 G/DL (ref 12–15.9)
IMM GRANULOCYTES # BLD AUTO: 0.03 10*3/MM3 (ref 0–0.05)
IMM GRANULOCYTES NFR BLD AUTO: 0.4 % (ref 0–0.5)
LYMPHOCYTES # BLD AUTO: 2.41 10*3/MM3 (ref 0.7–3.1)
LYMPHOCYTES NFR BLD AUTO: 29.8 % (ref 19.6–45.3)
MAGNESIUM SERPL-MCNC: 2.1 MG/DL (ref 1.6–2.4)
MCH RBC QN AUTO: 28.7 PG (ref 26.6–33)
MCHC RBC AUTO-ENTMCNC: 32.3 G/DL (ref 31.5–35.7)
MCV RBC AUTO: 89.1 FL (ref 79–97)
MONOCYTES # BLD AUTO: 0.65 10*3/MM3 (ref 0.1–0.9)
MONOCYTES NFR BLD AUTO: 8 % (ref 5–12)
NEUTROPHILS NFR BLD AUTO: 4.83 10*3/MM3 (ref 1.7–7)
NEUTROPHILS NFR BLD AUTO: 59.8 % (ref 42.7–76)
NRBC BLD AUTO-RTO: 0 /100 WBC (ref 0–0.2)
PHOSPHATE SERPL-MCNC: 2.9 MG/DL (ref 2.5–4.5)
PLATELET # BLD AUTO: 183 10*3/MM3 (ref 140–450)
PMV BLD AUTO: 10.1 FL (ref 6–12)
POTASSIUM SERPL-SCNC: 4.2 MMOL/L (ref 3.5–5.2)
PROT SERPL-MCNC: 6.2 G/DL (ref 6–8.5)
QT INTERVAL: 416 MS
RBC # BLD AUTO: 4.21 10*6/MM3 (ref 3.77–5.28)
SODIUM SERPL-SCNC: 136 MMOL/L (ref 136–145)
WBC # BLD AUTO: 8.08 10*3/MM3 (ref 3.4–10.8)

## 2021-10-21 PROCEDURE — 80076 HEPATIC FUNCTION PANEL: CPT | Performed by: FAMILY MEDICINE

## 2021-10-21 PROCEDURE — 80048 BASIC METABOLIC PNL TOTAL CA: CPT | Performed by: FAMILY MEDICINE

## 2021-10-21 PROCEDURE — 25010000002 HYDRALAZINE PER 20 MG: Performed by: FAMILY MEDICINE

## 2021-10-21 PROCEDURE — 83735 ASSAY OF MAGNESIUM: CPT | Performed by: FAMILY MEDICINE

## 2021-10-21 PROCEDURE — 82962 GLUCOSE BLOOD TEST: CPT

## 2021-10-21 PROCEDURE — 92507 TX SP LANG VOICE COMM INDIV: CPT

## 2021-10-21 PROCEDURE — 85025 COMPLETE CBC W/AUTO DIFF WBC: CPT | Performed by: FAMILY MEDICINE

## 2021-10-21 PROCEDURE — 97530 THERAPEUTIC ACTIVITIES: CPT

## 2021-10-21 PROCEDURE — 84100 ASSAY OF PHOSPHORUS: CPT | Performed by: FAMILY MEDICINE

## 2021-10-21 PROCEDURE — 99232 SBSQ HOSP IP/OBS MODERATE 35: CPT | Performed by: FAMILY MEDICINE

## 2021-10-21 RX ADMIN — ACETAMINOPHEN 650 MG: 325 TABLET ORAL at 08:09

## 2021-10-21 RX ADMIN — LISINOPRIL 40 MG: 20 TABLET ORAL at 08:10

## 2021-10-21 RX ADMIN — METOPROLOL SUCCINATE 50 MG: 50 TABLET, EXTENDED RELEASE ORAL at 08:10

## 2021-10-21 RX ADMIN — SODIUM CHLORIDE, PRESERVATIVE FREE 10 ML: 5 INJECTION INTRAVENOUS at 08:11

## 2021-10-21 RX ADMIN — SODIUM CHLORIDE, PRESERVATIVE FREE 10 ML: 5 INJECTION INTRAVENOUS at 20:43

## 2021-10-21 RX ADMIN — METOPROLOL SUCCINATE 50 MG: 50 TABLET, EXTENDED RELEASE ORAL at 20:43

## 2021-10-21 RX ADMIN — HYDRALAZINE HYDROCHLORIDE 20 MG: 20 INJECTION INTRAMUSCULAR; INTRAVENOUS at 20:44

## 2021-10-21 NOTE — PROGRESS NOTES
[facility]   Hospitalist Progress Note  Date: 10/21/2021  Patient Name: Kimber Carranza  : 1950  MRN: [id number]  Date of admission: 10/18/2021      Subjective   Subjective     Admitted: 10/18/2021  Chief complaint: Altered mental status  Consultants: Dr. Pablo-neurosurgery, Dr. Guadarrama-neurology, Dr. Elizondo-critical care  Summary:  71-year-old female with history of essential hypertension, diabetes mellitus, mixed hyperlipidemia, presented to the emergency room with chief complaint of altered mental status.  Found to have hypertensive crisis, head CT revealed intraparenchymal hemorrhage in the right occipital lobe, right temporal lobe, with subarachnoid hemorrhage within both parietal lobes and inferior right temporal lobe.  Neurosurgery was consulted, hospital service requested to admit and further manage.  MRI brain showing similar findings to head CT, repeat head CT showing associated mass-effect effaces in the right ventricular atrium and occipital horn, echo obtained, left ventricular diastolic dysfunction.    Interval follow-up: Patient seen and examined this morning, no acute distress, no acute major overnight events, patient's  at the bedside, she continues to have improvement in her speech and word finding, her NIH is a 5, her blood pressure has been well controlled, systolic in the 100s, diastolic in the 70s, I reviewed her telemetry monitor for the past 24 hours, no acute stays or rhythm events, few PVCs, baseline sinus rhythm in the 60s.  She has no new focal neurological weakness or changes.  She is working well with therapy.  She notes headaches are improving but still persistent, now on the left side.    Review of Systems:  All systems were reviewed and negative except for left-sided neglect, difficulty seeing laterally on her left side, right-sided facial droop, aphasia, difficulty finding the right words, left-sided headache.    Objective   Objective     Vitals:   Temp:  [97.2  °F (36.2 °C)-98.7 °F (37.1 °C)] 97.2 °F (36.2 °C)  Heart Rate:  [56-65] 59  Resp:  [16-22] 18  BP: (106-149)/() 107/76  Physical Exam    Constitutional: Awake, alert, answering questions, having word finding difficulty seems to have improved from previous day, no acute distress              Eyes: Pupils equal, sclerae anicteric, no conjunctival injection              HENT: NCAT, mucous membranes moist, EOMI, difficulty seeing out of her left eye laterally              Neck: Supple, full range of motion              Respiratory: Clear to auscultation bilaterally, nonlabored respirations               Cardiovascular: RRR, no murmurs, rubs, or gallops, palpable pedal pulses bilaterally              Gastrointestinal: Positive bowel sounds, soft, nontender, nondistended              Musculoskeletal: No bilateral ankle edema, no clubbing or cyanosis to extremities              Psychiatric: Appropriate affect, cooperative              Neurologic: Oriented x 3, strength symmetric in all extremities, word finding difficulty at times, cranial Nerves grossly intact to confrontation, speech clear, left-sided neglect              Skin: No rashes     Result Review    Result Review:  I have personally reviewed the results from the time of this admission to 10/21/2021 10:27 EDT and agree with these findings:  [x]  Laboratory  [x]  Microbiology  [x]  Radiology  [x]  EKG/Telemetry   []  Cardiology/Vascular   []  Pathology  [x]  Old records  []  Other:    Assessment/Plan   Assessment / Plan     Assessment/Plan:  Assessment:  Acute hemorrhagic stroke  Intraparenchymal hematomas in the posterior right cerebral hemisphere; occipital, temporal,  Subarachnoid hemorrhage bilateral parietal lobes, inferior right temporal  Hypertensive crisis  Essential hypertension; uncontrolled  Diabetes mellitus  Dyslipidemia     Plan:  Labs imaging reviewed  Continuing slow recovery  Metoprolol succinate XL continued at 50 mg twice a day  Closely  follow her heart rate and blood pressures  Maintain systolic blood pressures less than 140  Lisinopril dose continued at 40 mg daily  Neurochecks per protocol  Stroke protocol  No anticoagulation, no antiplatelet  High intensity statin continue  Neurology consulted Dr. Guadarrama, discussed with him  Neurosurgery consulted Dr. Pablo, recommendations appreciated  Stat head CT with any change in mentation  Sliding-scale insulin coverage  Insulin sliding scale coverage  Diet per speech  PT/OT continue  A.m. labs  VTE prophylaxis SCDs  Clinical course to dictate further management  Discussed with nurse at the bedside, discussed with patient's  at bedside, all questions answered.  Working towards rehab placement.      DVT prophylaxis:  Mechanical DVT prophylaxis orders are present.    CODE STATUS:   Code Status: CPR  Medical Interventions (Level of Support Prior to Arrest): Full    Electronically signed by Morenita Lovett MD, 10/21/21, 10:27 AM EDT.

## 2021-10-21 NOTE — THERAPY TREATMENT NOTE
Acute Care - Speech Language Pathology Treatment Note  JACKELINE Edouard     Patient Name: Kimber Carranza  : 1950  MRN: [id number]  Today's Date: 10/21/2021               Admit Date: 10/18/2021     Visit Dx:    ICD-10-CM ICD-9-CM   1. Intraparenchymal hemorrhage of brain (HCC)  I61.9 431   2. Subarachnoid hemorrhage (HCC)  I60.9 430   3. Primary hypertension  I10 401.9   4. Decreased activities of daily living (ADL)  Z78.9 V49.89   5. Difficulty walking  R26.2 719.7     Patient Active Problem List   Diagnosis   • Essential hypertension   • Palpitations   • Intraparenchymal hemorrhage of brain (HCC)     Past Medical History:   Diagnosis Date   • Diabetes mellitus (HCC)    • Elevated cholesterol    • Essential hypertension 2021   • Palpitations 2021   • Stroke (HCC)      Past Surgical History:   Procedure Laterality Date   • HYSTERECTOMY     Subjective: Patient seen for speech tx.    Observations: Family at bedside.  Patient sitting on side of bed finishing lunch.      Treatment objective: Patient/family education regarding right brain deficits and compensatory strategies.  Conversational discourse free of word finding errors but overall was a bit tangential and with difficulty turn taking.  Difficulty responding to general questions throughout session due to reduced attention to speaker.  Max cues to establish eye contact with speaker which appeared to facilitate improved response to yes/no questions.    Attention to task for scanning from left to right required max cues (visual/verbal and tactile) with 60% acc noted.     Progress toward goals: Progressing      Plan of care/changes in plan: Continue per plan of care.    Patient to [facility] soon, will need high level cognitive-linguistic evaluation.             EDUCATION  The patient has been educated in the following areas:     Cognitive Impairment Communication Impairment.                 Megan Chino, SLP  10/21/2021

## 2021-10-21 NOTE — THERAPY TREATMENT NOTE
Acute Care - Physical Therapy Treatment Note  JACKELINE Edouard     Patient Name: Kimber Carranza  : 1950  MRN: [id number]  Today's Date: 10/21/2021      Visit Dx:     ICD-10-CM ICD-9-CM   1. Intraparenchymal hemorrhage of brain (HCC)  I61.9 431   2. Subarachnoid hemorrhage (HCC)  I60.9 430   3. Primary hypertension  I10 401.9   4. Decreased activities of daily living (ADL)  Z78.9 V49.89   5. Difficulty walking  R26.2 719.7     Patient Active Problem List   Diagnosis   • Essential hypertension   • Palpitations   • Intraparenchymal hemorrhage of brain (HCC)     Past Medical History:   Diagnosis Date   • Diabetes mellitus (HCC)    • Elevated cholesterol    • Essential hypertension 2021   • Palpitations 2021   • Stroke (HCC)      Past Surgical History:   Procedure Laterality Date   • HYSTERECTOMY       PT Assessment (last 12 hours)     PT Evaluation and Treatment     Row Name 10/21/21 1200          Physical Therapy Time and Intention    Subjective Information complains of; fatigue  -DP     Document Type therapy note (daily note)  -DP     Mode of Treatment individual therapy; physical therapy  -DP     Patient Effort excellent  -DP     Row Name 10/21/21 1200          Bed Mobility    Bed Mobility supine-sit-supine  -DP     Supine-Sit-Supine Weston (Bed Mobility) contact guard; minimum assist (75% patient effort)  -DP     Assistive Device (Bed Mobility) head of bed elevated; bed rails  -DP     Row Name 10/21/21 1200          Transfers    Transfers sit-stand transfer  -DP     Sit-Stand Weston (Transfers) contact guard  -DP     Row Name 10/21/21 1200          Gait/Stairs (Locomotion)    Gait/Stairs Locomotion gait/ambulation independence  -DP     Weston Level (Gait) contact guard  -DP     Assistive Device (Gait) other (see comments)  no AD  -DP     Distance in Feet (Gait) 50  x3  -DP     Deviations/Abnormal Patterns (Gait) jeffrey decreased; base of support, narrow  -DP     Row Name 10/21/21  1200          Balance    Balance Assessment standing dynamic balance  -DP     Dynamic Standing Balance mild impairment  -DP     Row Name 10/21/21 1200          Motor Skills    Motor Skills motor control/coordination interventions  -DP     Motor Control/Coordination Interventions functional task specific training  -DP     Gross Motor Skill, Impairments Detail Therapist spent extended period of time educating the patient to move her head to cover the whole visual field.  Patient participated in sitting balance exercises and reaching exercises focusing more on the left side where patient was encouraged to move her head on the left to finish the task.  -DP     Row Name 10/21/21 1200          Progress Summary (PT)    Progress Toward Functional Goals (PT) progress toward functional goals is good  -DP     Daily Progress Summary (PT) Patient reported not seeing double today in her left visual field.  Patient was able to perceive and follow directions a lot better coming from the left side  Compared to yesterday.  Patient patient has shown significant improvement with processing time and her ability to hold a conversation.  Patient also was acknowledging her left side a lot more today and moving her head to the left side and using the left upper extremity to complete a task.  Patient also was able to ambulate without an assistive device and hand-held assistance today.  -DP           User Key  (r) = Recorded By, (t) = Taken By, (c) = Cosigned By    Initials Name Provider Type    Arianna Galindo, PT Physical Therapist              Physical Therapy Education                 Title: PT OT SLP Therapies (In Progress)     Topic: Physical Therapy (Done)     Point: Mobility training (Done)     Learning Progress Summary           Patient Acceptance, E,TB, VU by DP at 10/20/2021 1505                   Point: Home exercise program (Done)     Learning Progress Summary           Patient Acceptance, E,TB, VU by JAIMEE at 10/20/2021 1505                    Point: Body mechanics (Done)     Learning Progress Summary           Patient Acceptance, E,TB, VU by DP at 10/20/2021 1505                   Point: Precautions (Done)     Learning Progress Summary           Patient Acceptance, E,TB, VU by DP at 10/20/2021 1505                               User Key     Initials Effective Dates Name Provider Type Discipline    DP 06/03/21 -  Arianna Perez, PT Physical Therapist PT              PT Recommendation and Plan  Anticipated Discharge Disposition (PT): inpatient rehabilitation facility, sub acute care setting  Planned Therapy Interventions (PT): balance training, bed mobility training, gait training, strengthening, transfer training  Therapy Frequency (PT): daily  Progress Summary (PT)  Progress Toward Functional Goals (PT): progress toward functional goals is good  Daily Progress Summary (PT): Patient reported not seeing double today in her left visual field.  Patient was able to perceive and follow directions a lot better coming from the left side  Compared to yesterday.  Patient patient has shown significant improvement with processing time and her ability to hold a conversation.  Patient also was acknowledging her left side a lot more today and moving her head to the left side and using the left upper extremity to complete a task.  Patient also was able to ambulate without an assistive device and hand-held assistance today.  Plan of Care Reviewed With: patient  Outcome Summary: Patient presents with balance, functional mobility deficits.  She will benefit from skilled physical therapy services in the hospital and placement in a rehab hospital.   Outcome Measures     Row Name 10/21/21 1200 10/20/21 1500          How much help from another person do you currently need...    Turning from your back to your side while in flat bed without using bedrails? 4  -DP 3  -DP     Moving from lying on back to sitting on the side of a flat bed without bedrails? 3  -DP 3   -DP     Moving to and from a bed to a chair (including a wheelchair)? 3  -DP 3  -DP     Standing up from a chair using your arms (e.g., wheelchair, bedside chair)? 3  -DP 3  -DP     Climbing 3-5 steps with a railing? 3  -DP 2  -DP     To walk in hospital room? 3  -DP 3  -DP     AM-PAC 6 Clicks Score (PT) 19  -DP 17  -DP            Functional Assessment    Outcome Measure Options AM-PAC 6 Clicks Basic Mobility (PT)  -DP AM-PAC 6 Clicks Basic Mobility (PT)  -DP           User Key  (r) = Recorded By, (t) = Taken By, (c) = Cosigned By    Initials Name Provider Type    Arianna Galindo, PT Physical Therapist                 Time Calculation:    PT Charges     Row Name 10/21/21 1240             Time Calculation    PT Received On 10/21/21  -DP      PT Goal Re-Cert Due Date 10/29/21  -DP              Timed Charges    11319 - PT Therapeutic Activity Minutes 45  -DP              Total Minutes    Timed Charges Total Minutes 45  -DP       Total Minutes 45  -DP            User Key  (r) = Recorded By, (t) = Taken By, (c) = Cosigned By    Initials Name Provider Type    Arianna Galindo, PT Physical Therapist              Therapy Charges for Today     Code Description Service Date Service Provider Modifiers Qty    37598045809  PT EVAL LOW COMPLEXITY 3 10/20/2021 Arianna Perez, PT GP 1    [id number]  PT THERAPEUTIC ACT EA 15 MIN 10/21/2021 Arianna Perez, PT GP 3          PT G-Codes  Outcome Measure Options: AM-PAC 6 Clicks Basic Mobility (PT)  AM-PAC 6 Clicks Score (PT): 19  AM-PAC 6 Clicks Score (OT): 11    Arianna Perez PT  10/21/2021

## 2021-10-21 NOTE — PLAN OF CARE
Goal Outcome Evaluation:      Nutrition f/u.  LOS x 3 days.  Pt eating <50%. Pt not tolerating Boost Glu control flavor. Willing to accept alternate flavor. Obtained preferences and tolerances. Pt not ready for diet education. Will continue f/u.

## 2021-10-21 NOTE — PLAN OF CARE
Goal Outcome Evaluation:   No changes this shift. NIH has remained at baseline.  has been contacted to discuss rehab options with pt and family. Pt has asked to discuss outpatient behavioral therapy.

## 2021-10-21 NOTE — THERAPY TREATMENT NOTE
Patient Name: Kimber Carranza  : 1950    MRN: [id number]                              Today's Date: 10/21/2021       Admit Date: 10/18/2021    Visit Dx:     ICD-10-CM ICD-9-CM   1. Intraparenchymal hemorrhage of brain (HCC)  I61.9 431   2. Subarachnoid hemorrhage (HCC)  I60.9 430   3. Primary hypertension  I10 401.9   4. Decreased activities of daily living (ADL)  Z78.9 V49.89   5. Difficulty walking  R26.2 719.7     Patient Active Problem List   Diagnosis   • Essential hypertension   • Palpitations   • Intraparenchymal hemorrhage of brain (HCC)     Past Medical History:   Diagnosis Date   • Diabetes mellitus (HCC)    • Elevated cholesterol    • Essential hypertension 2021   • Palpitations 2021   • Stroke (HCC)      Past Surgical History:   Procedure Laterality Date   • HYSTERECTOMY        General Information     Row Name 10/21/21 1515          OT Time and Intention    Document Type therapy note (daily note)  -ES     Mode of Treatment individual therapy; occupational therapy  -ES     Row Name 10/21/21 1515          General Information    Existing Precautions/Restrictions other (see comments)  L side neglect  -ES     Row Name 10/21/21 1515          Cognition    Orientation Status (Cognition) oriented x 3  -ES     Row Name 10/21/21 1515          Safety Issues, Functional Mobility    Impairments Affecting Function (Mobility) balance; endurance/activity tolerance; strength; sensation/sensory awareness; visual/perceptual  -ES           User Key  (r) = Recorded By, (t) = Taken By, (c) = Cosigned By    Initials Name Provider Type    ES Norma Acosta OT Occupational Therapist                 Mobility/ADL's     Row Name 10/21/21 1516          Bed Mobility    Bed Mobility supine-sit; sit-supine  -ES     Supine-Sit [place] (Bed Mobility) minimum assist (75% patient effort)  -ES     Sit-Supine [place] (Bed Mobility) minimum assist (75% patient effort)  -ES     Comment (Bed Mobility) Pt seated edge  of bed to complete functional visual scanning activity  -ES           User Key  (r) = Recorded By, (t) = Taken By, (c) = Cosigned By    Initials Name Provider Type    Norma Huff OT Occupational Therapist               Obj/Interventions     Row Name 10/21/21 1517          Vision Assessment/Intervention    Visual Impairment/Limitations visual/perceptual impairments present  Patient engaged in functional scanning task this session seated edge of bed to address L sided scanning deficit. Patient appropriatley scans for 2 out of 5 items in 1 trial, 3 out of 5 in trial 2, and 2 out of 5 in trial 3. Max VCs for L side attention.  -ES     Visual Motor Impairment visual tracking, left  -ES           User Key  (r) = Recorded By, (t) = Taken By, (c) = Cosigned By    Initials Name Provider Type    Norma Huff OT Occupational Therapist               Goals/Plan    No documentation.                Clinical Impression     Row Name 10/21/21 1519          Pain Scale: Numbers Pre/Post-Treatment    Pretreatment Pain Rating 0/10 - no pain  -ES     Posttreatment Pain Rating 0/10 - no pain  -ES     Row Name 10/21/21 1519          Plan of Care Review    Plan of Care Reviewed With patient; family  -ES     Progress no change  -ES     Outcome Summary Patient with good participation in therapy session. Patient engaged in visual scanning task to address L sided scanning deficits. Patient with poor visual scanning, requiring max VCs to orient to items on L side. Patient identifies 2 out of 5 items on 2 trials and 3 out of 5 on 1 trial. Patient provided compensation stratagies for L visual field deficits. Patient would benefit from continued skilled OT intervention to maxamize patient safety and promote return to baseline.  -ES     Row Name 10/21/21 151          Therapy Plan Review/Discharge Plan (OT)    Anticipated Discharge Disposition (OT) inpatient rehabilitation facility  -ES           User Key  (r) = Recorded By, (t) = Taken  By, (c) = Cosigned By    Initials Name Provider Type    Norma Huff OT Occupational Therapist               Outcome Measures     Row Name 10/21/21 1522          How much help from another is currently needed...    Putting on and taking off regular lower body clothing? 1  -ES     Bathing (including washing, rinsing, and drying) 1  -ES     Toileting (which includes using toilet bed pan or urinal) 2  -ES     Putting on and taking off regular upper body clothing 3  -ES     Taking care of personal grooming (such as brushing teeth) 3  -ES     Eating meals 4  -ES     AM-PAC 6 Clicks Score (OT) 14  -ES     Row Name 10/21/21 1200 10/21/21 0751       How much help from another person do you currently need...    Turning from your back to your side while in flat bed without using bedrails? 4  -DP 3  -MS    Moving from lying on back to sitting on the side of a flat bed without bedrails? 3  -DP 3  -MS    Moving to and from a bed to a chair (including a wheelchair)? 3  -DP 3  -MS    Standing up from a chair using your arms (e.g., wheelchair, bedside chair)? 3  -DP 3  -MS    Climbing 3-5 steps with a railing? 3  -DP 2  -MS    To walk in hospital room? 3  -DP 3  -MS    AM-PAC 6 Clicks Score (PT) 19  -DP 17  -MS    Row Name 10/21/21 1522 10/21/21 1200       Functional Assessment    Outcome Measure Options AM-PAC 6 Clicks Daily Activity (OT); Optimal Instrument  -ES AM-PAC 6 Clicks Basic Mobility (PT)  -DP    Row Name 10/21/21 1522          Optimal Instrument    Optimal Instrument Optimal - 3  -ES     Bending/Stooping 4  -ES     Standing 2  -ES     Reaching 2  -ES           User Key  (r) = Recorded By, (t) = Taken By, (c) = Cosigned By    Initials Name Provider Type    Lynette Salas, RNA Registered Nurse    Arianna Galindo, PT Physical Therapist    Nroma Huff OT Occupational Therapist                Occupational Therapy Education                 Title: PT OT SLP Therapies (In Progress)     Topic: Occupational  Therapy (In Progress)     Point: ADL training (In Progress)     Description:   Instruct learner(s) on proper safety adaptation and remediation techniques during self care or transfers.   Instruct in proper use of assistive devices.              Learning Progress Summary           Patient Acceptance, E,D, NR by PG at 10/20/2021 0922                   Point: Home exercise program (In Progress)     Description:   Instruct learner(s) on appropriate technique for monitoring, assisting and/or progressing therapeutic exercises/activities.              Learning Progress Summary           Patient Acceptance, E,D, NR by PG at 10/20/2021 0922                   Point: Precautions (In Progress)     Description:   Instruct learner(s) on prescribed precautions during self-care and functional transfers.              Learning Progress Summary           Patient Acceptance, E,D, NR by PG at 10/20/2021 0922                   Point: Body mechanics (In Progress)     Description:   Instruct learner(s) on proper positioning and spine alignment during self-care, functional mobility activities and/or exercises.              Learning Progress Summary           Patient Acceptance, E,D, NR by PG at 10/20/2021 0922                               User Key     Initials Effective Dates Name Provider Type Discipline    PG 06/16/21 -  Ramone Gurrola OT Occupational Therapist OT              OT Recommendation and Plan     Plan of Care Review  Plan of Care Reviewed With: patient, family  Progress: no change  Outcome Summary: Patient with good participation in therapy session. Patient engaged in visual scanning task to address L sided scanning deficits. Patient with poor visual scanning, requiring max VCs to orient to items on L side. Patient identifies 2 out of 5 items on 2 trials and 3 out of 5 on 1 trial. Patient provided compensation stratagies for L visual field deficits. Patient would benefit from continued skilled OT intervention to maxamize  patient safety and promote return to baseline.     Time Calculation:    Time Calculation- OT     Row Name 10/21/21 1523             Time Calculation- OT    OT Received On 10/21/21  -ES      OT Goal Re-Cert Due Date 10/29/21  -ES              Timed Charges    07732 - OT Therapeutic Activity Minutes 10  -ES              Total Minutes    Timed Charges Total Minutes 10  -ES       Total Minutes 10  -ES            User Key  (r) = Recorded By, (t) = Taken By, (c) = Cosigned By    Initials Name Provider Type     Norma Acosta OT Occupational Therapist              Therapy Charges for Today     Code Description Service Date Service Provider Modifiers Qty    [id number]  OT THERAPEUTIC ACT EA 15 MIN 10/21/2021 Norma Acosta OT GO 1               Norma Acosta OT  10/21/2021

## 2021-10-22 VITALS
RESPIRATION RATE: 18 BRPM | DIASTOLIC BLOOD PRESSURE: 64 MMHG | BODY MASS INDEX: 33.2 KG/M2 | HEIGHT: 63 IN | HEART RATE: 62 BPM | SYSTOLIC BLOOD PRESSURE: 130 MMHG | OXYGEN SATURATION: 95 % | TEMPERATURE: 97.2 F | WEIGHT: 187.39 LBS

## 2021-10-22 PROBLEM — E11.9 DIABETES: Status: ACTIVE | Noted: 2021-10-22

## 2021-10-22 PROBLEM — K21.9 GASTRIC REFLUX: Status: ACTIVE | Noted: 2021-10-22

## 2021-10-22 LAB
ALBUMIN SERPL-MCNC: 3.7 G/DL (ref 3.5–5.2)
ALP SERPL-CCNC: 47 U/L (ref 39–117)
ALT SERPL W P-5'-P-CCNC: 20 U/L (ref 1–33)
ANION GAP SERPL CALCULATED.3IONS-SCNC: 12.1 MMOL/L (ref 5–15)
AST SERPL-CCNC: 17 U/L (ref 1–32)
BASOPHILS # BLD AUTO: 0.04 10*3/MM3 (ref 0–0.2)
BASOPHILS NFR BLD AUTO: 0.4 % (ref 0–1.5)
BILIRUB CONJ SERPL-MCNC: <0.2 MG/DL (ref 0–0.3)
BILIRUB INDIRECT SERPL-MCNC: NORMAL MG/DL
BILIRUB SERPL-MCNC: 0.5 MG/DL (ref 0–1.2)
BUN SERPL-MCNC: 14 MG/DL (ref 8–23)
BUN/CREAT SERPL: 17.3 (ref 7–25)
CALCIUM SPEC-SCNC: 8.9 MG/DL (ref 8.6–10.5)
CHLORIDE SERPL-SCNC: 101 MMOL/L (ref 98–107)
CO2 SERPL-SCNC: 21.9 MMOL/L (ref 22–29)
CREAT SERPL-MCNC: 0.81 MG/DL (ref 0.57–1)
DEPRECATED RDW RBC AUTO: 39.7 FL (ref 37–54)
EOSINOPHIL # BLD AUTO: 0.22 10*3/MM3 (ref 0–0.4)
EOSINOPHIL NFR BLD AUTO: 2 % (ref 0.3–6.2)
ERYTHROCYTE [DISTWIDTH] IN BLOOD BY AUTOMATED COUNT: 12.6 % (ref 12.3–15.4)
GFR SERPL CREATININE-BSD FRML MDRD: 69 ML/MIN/1.73
GLUCOSE BLDC GLUCOMTR-MCNC: 134 MG/DL (ref 70–99)
GLUCOSE BLDC GLUCOMTR-MCNC: 138 MG/DL (ref 70–99)
GLUCOSE SERPL-MCNC: 114 MG/DL (ref 65–99)
HCT VFR BLD AUTO: 41.2 % (ref 34–46.6)
HGB BLD-MCNC: 13.6 G/DL (ref 12–15.9)
IMM GRANULOCYTES # BLD AUTO: 0.04 10*3/MM3 (ref 0–0.05)
IMM GRANULOCYTES NFR BLD AUTO: 0.4 % (ref 0–0.5)
LYMPHOCYTES # BLD AUTO: 2.46 10*3/MM3 (ref 0.7–3.1)
LYMPHOCYTES NFR BLD AUTO: 22.2 % (ref 19.6–45.3)
MAGNESIUM SERPL-MCNC: 2.2 MG/DL (ref 1.6–2.4)
MCH RBC QN AUTO: 28.5 PG (ref 26.6–33)
MCHC RBC AUTO-ENTMCNC: 33 G/DL (ref 31.5–35.7)
MCV RBC AUTO: 86.4 FL (ref 79–97)
MONOCYTES # BLD AUTO: 0.84 10*3/MM3 (ref 0.1–0.9)
MONOCYTES NFR BLD AUTO: 7.6 % (ref 5–12)
NEUTROPHILS NFR BLD AUTO: 67.4 % (ref 42.7–76)
NEUTROPHILS NFR BLD AUTO: 7.49 10*3/MM3 (ref 1.7–7)
NRBC BLD AUTO-RTO: 0 /100 WBC (ref 0–0.2)
PHOSPHATE SERPL-MCNC: 2.9 MG/DL (ref 2.5–4.5)
PLATELET # BLD AUTO: 234 10*3/MM3 (ref 140–450)
PMV BLD AUTO: 10 FL (ref 6–12)
POTASSIUM SERPL-SCNC: 4.1 MMOL/L (ref 3.5–5.2)
PROT SERPL-MCNC: 6.6 G/DL (ref 6–8.5)
RBC # BLD AUTO: 4.77 10*6/MM3 (ref 3.77–5.28)
SODIUM SERPL-SCNC: 135 MMOL/L (ref 136–145)
TROPONIN T SERPL-MCNC: <0.01 NG/ML (ref 0–0.03)
WBC # BLD AUTO: 11.09 10*3/MM3 (ref 3.4–10.8)

## 2021-10-22 PROCEDURE — 25010000002 HYDRALAZINE PER 20 MG: Performed by: FAMILY MEDICINE

## 2021-10-22 PROCEDURE — 84100 ASSAY OF PHOSPHORUS: CPT | Performed by: PHYSICIAN ASSISTANT

## 2021-10-22 PROCEDURE — 97116 GAIT TRAINING THERAPY: CPT

## 2021-10-22 PROCEDURE — 84484 ASSAY OF TROPONIN QUANT: CPT | Performed by: FAMILY MEDICINE

## 2021-10-22 PROCEDURE — 99239 HOSP IP/OBS DSCHRG MGMT >30: CPT | Performed by: FAMILY MEDICINE

## 2021-10-22 PROCEDURE — 85025 COMPLETE CBC W/AUTO DIFF WBC: CPT | Performed by: PHYSICIAN ASSISTANT

## 2021-10-22 PROCEDURE — 93005 ELECTROCARDIOGRAM TRACING: CPT | Performed by: FAMILY MEDICINE

## 2021-10-22 PROCEDURE — 80076 HEPATIC FUNCTION PANEL: CPT | Performed by: PHYSICIAN ASSISTANT

## 2021-10-22 PROCEDURE — 97530 THERAPEUTIC ACTIVITIES: CPT

## 2021-10-22 PROCEDURE — 83735 ASSAY OF MAGNESIUM: CPT | Performed by: PHYSICIAN ASSISTANT

## 2021-10-22 PROCEDURE — 82962 GLUCOSE BLOOD TEST: CPT

## 2021-10-22 PROCEDURE — 80048 BASIC METABOLIC PNL TOTAL CA: CPT | Performed by: PHYSICIAN ASSISTANT

## 2021-10-22 RX ORDER — PANTOPRAZOLE SODIUM 40 MG/1
40 TABLET, DELAYED RELEASE ORAL
Qty: 30 TABLET | Refills: 0 | Status: SHIPPED | OUTPATIENT
Start: 2021-10-23

## 2021-10-22 RX ORDER — PANTOPRAZOLE SODIUM 40 MG/1
40 TABLET, DELAYED RELEASE ORAL
Status: DISCONTINUED | OUTPATIENT
Start: 2021-10-22 | End: 2021-10-22 | Stop reason: HOSPADM

## 2021-10-22 RX ORDER — LISINOPRIL 40 MG/1
40 TABLET ORAL
Qty: 30 TABLET | Refills: 0
Start: 2021-10-23 | End: 2022-09-27

## 2021-10-22 RX ORDER — ATORVASTATIN CALCIUM 40 MG/1
40 TABLET, FILM COATED ORAL NIGHTLY
Status: DISCONTINUED | OUTPATIENT
Start: 2021-10-22 | End: 2021-10-22

## 2021-10-22 RX ORDER — AMLODIPINE BESYLATE 5 MG/1
5 TABLET ORAL
Status: DISCONTINUED | OUTPATIENT
Start: 2021-10-22 | End: 2021-10-22 | Stop reason: HOSPADM

## 2021-10-22 RX ORDER — AMLODIPINE BESYLATE 5 MG/1
5 TABLET ORAL
Qty: 30 TABLET | Refills: 0
Start: 2021-10-23 | End: 2022-09-27 | Stop reason: DRUGHIGH

## 2021-10-22 RX ORDER — METOPROLOL SUCCINATE 50 MG/1
50 TABLET, EXTENDED RELEASE ORAL EVERY 12 HOURS SCHEDULED
Qty: 60 TABLET | Refills: 0 | Status: SHIPPED | OUTPATIENT
Start: 2021-10-22 | End: 2022-03-29 | Stop reason: SDUPTHER

## 2021-10-22 RX ADMIN — AMLODIPINE BESYLATE 5 MG: 5 TABLET ORAL at 09:21

## 2021-10-22 RX ADMIN — METOPROLOL SUCCINATE 50 MG: 50 TABLET, EXTENDED RELEASE ORAL at 09:20

## 2021-10-22 RX ADMIN — PANTOPRAZOLE SODIUM 40 MG: 40 TABLET, DELAYED RELEASE ORAL at 12:41

## 2021-10-22 RX ADMIN — HYDRALAZINE HYDROCHLORIDE 20 MG: 20 INJECTION INTRAMUSCULAR; INTRAVENOUS at 10:45

## 2021-10-22 RX ADMIN — LISINOPRIL 40 MG: 20 TABLET ORAL at 09:17

## 2021-10-22 RX ADMIN — SODIUM CHLORIDE, PRESERVATIVE FREE 10 ML: 5 INJECTION INTRAVENOUS at 09:21

## 2021-10-22 NOTE — CASE MANAGEMENT/SOCIAL WORK
[facility]   Social Work Discharge Plan    Patient Name: Kimber Carranza  : 1950  MRN: [id number]  Attending Physician:  Morenita Lovett MD  Date of admission: 10/18/2021      /Case Management Discharge Plan     Inpatient Rehab:    Pt will discharge to [facility] today.    [facility].  [address] [place] [address] [address] [place], [place]  [phone]    SW working on OP Pt behavioral therapy set up with [facility] after Inpatient rehab is complete.

## 2021-10-22 NOTE — THERAPY TREATMENT NOTE
Acute Care - Physical Therapy Treatment Note  JACKELINE [facility]od[facility]ard     Patient Name: Kimber Carranza  : 1950  MRN: [id number]  Today's Date: 10/22/2021      Visit Dx:     ICD-10-CM ICD-9-CM   1. Intraparenchymal hemorrhage of brain (HCC)  I61.9 431   2. Subarachnoid hemorrhage (HCC)  I60.9 430   3. Primary hypertension  I10 401.9   4. Decreased activities of daily living (ADL)  Z78.9 V49.89   5. Difficulty walking  R26.2 719.7     Patient Active Problem List   Diagnosis   • Essential hypertension   • Palpitations   • Intraparenchymal hemorrhage of brain (HCC)   • Diabetes (HCC)   • Gastric reflux     Past Medical History:   Diagnosis Date   • Diabetes mellitus (HCC)    • Elevated cholesterol    • Essential hypertension 2021   • Palpitations 2021   • Stroke (HCC)      Past Surgical History:   Procedure Laterality Date   • HYSTERECTOMY       PT Assessment (last 12 hours)     PT Evaluation and Treatment     Row Name 10/22/21 1300          Physical Therapy Time and Intention    Subjective Information no complaints  -DP     Document Type therapy note (daily note)  -DP     Mode of Treatment individual therapy; physical therapy  -DP     Patient Effort excellent  -DP     Row Name 10/22/21 1300          Bed Mobility    Bed Mobility sit-supine  -DP     Sit-Supine [place] (Bed Mobility) independent; standby assist  -DP     Row Name 10/22/21 1300          Transfers    Transfers sit-stand transfer  -DP     Sit-Stand [place] (Transfers) standby assist  -DP     Row Name 10/22/21 1300          Gait/Stairs (Locomotion)    Gait/Stairs Locomotion gait/ambulation independence; gait/ambulation assistive device  -DP     [place] (Gait) contact guard  -DP     Assistive Device (Gait) walker, front-wheeled  -DP     Distance in Feet (Gait) 250'  -DP     Row Name 10/22/21 1300          Progress Summary (PT)    Progress Toward Functional Goals (PT) progress toward functional goals is good  -DP     Daily Progress  Summary (PT) Patient was able to report the television show when her R eye was covered. Patient was able to follow all commands and converse. She has shown significant improvement in vision, cognition, conversation, and processing. Patient was able to ambulate with a RW with only contact guard assist while naming different restaraunts. Ambulation required less focus. Patient was educated on gait training and exercises that she can do once she goes home.  -DP           User Key  (r) = Recorded By, (t) = Taken By, (c) = Cosigned By    Initials Name Provider Type    Arianna Galindo, PT Physical Therapist              Physical Therapy Education                 Title: PT OT SLP Therapies (In Progress)     Topic: Physical Therapy (Done)     Point: Mobility training (Done)     Learning Progress Summary           Patient Acceptance, E,TB, VU by JAIMEE at 10/20/2021 1505                   Point: Home exercise program (Done)     Learning Progress Summary           Patient Acceptance, E,TB, VU by JAIMEE at 10/20/2021 1505                   Point: Body mechanics (Done)     Learning Progress Summary           Patient Acceptance, E,TB, VU by JAIMEE at 10/20/2021 1505                   Point: Precautions (Done)     Learning Progress Summary           Patient Acceptance, E,TB, VU by DP at 10/20/2021 1505                               User Key     Initials Effective Dates Name Provider Type Discipline    JAIMEE 06/03/21 -  Arianna Perez PT Physical Therapist PT              PT Recommendation and Plan  Anticipated Discharge Disposition (PT): inpatient rehabilitation facility, sub acute care setting  Planned Therapy Interventions (PT): balance training, bed mobility training, gait training, strengthening, transfer training  Therapy Frequency (PT): daily  Progress Summary (PT)  Progress Toward Functional Goals (PT): progress toward functional goals is good  Daily Progress Summary (PT): Patient was able to report the television show when her R  eye was covered. Patient was able to follow all commands and converse. She has shown significant improvement in vision, cognition, conversation, and processing. Patient was able to ambulate with a RW with only contact guard assist while naming different restaraunts. Ambulation required less focus. Patient was educated on gait training and exercises that she can do once she goes home.  Plan of Care Reviewed With: patient  Outcome Summary: Patient presents with balance, functional mobility deficits.  She will benefit from skilled physical therapy services in the hospital and placement in a rehab hospital.   Outcome Measures     Row Name 10/22/21 1400 10/21/21 1200 10/20/21 1500       How much help from another person do you currently need...    Turning from your back to your side while in flat bed without using bedrails? 4  -DP 4  -DP 3  -DP    Moving from lying on back to sitting on the side of a flat bed without bedrails? 4  -DP 3  -DP 3  -DP    Moving to and from a bed to a chair (including a wheelchair)? 4  -DP 3  -DP 3  -DP    Standing up from a chair using your arms (e.g., wheelchair, bedside chair)? 4  -DP 3  -DP 3  -DP    Climbing 3-5 steps with a railing? 3  -DP 3  -DP 2  -DP    To walk in hospital room? 3  -DP 3  -DP 3  -DP    AM-PAC 6 Clicks Score (PT) 22  -DP 19  -DP 17  -DP       Functional Assessment    Outcome Measure Options AM-PAC 6 Clicks Basic Mobility (PT)  -DP AM-PAC 6 Clicks Basic Mobility (PT)  -DP AM-PAC 6 Clicks Basic Mobility (PT)  -DP          User Key  (r) = Recorded By, (t) = Taken By, (c) = Cosigned By    Initials Name Provider Type    Arianna Galindo, PT Physical Therapist                 Time Calculation:    PT Charges     Row Name 10/22/21 8324             Time Calculation    PT Received On 10/22/21  -DP      PT Goal Re-Cert Due Date 10/29/21  -DP              Timed Charges    23353 - Gait Training Minutes  12  -DP      09946 - PT Therapeutic Activity Minutes 10  -DP               Total Minutes    Timed Charges Total Minutes 22  -DP       Total Minutes 22  -DP            User Key  (r) = Recorded By, (t) = Taken By, (c) = Cosigned By    Initials Name Provider Type    Arianna Galindo PT Physical Therapist              Therapy Charges for Today     Code Description Service Date Service Provider Modifiers Qty    04441519444  PT THERAPEUTIC ACT EA 15 MIN 10/21/2021 Arianna Perez, PT GP 3    [id number] HC GAIT TRAINING EA 15 MIN 10/22/2021 Arianna Perez, PT GP 1    [id number]  PT THERAPEUTIC ACT EA 15 MIN 10/22/2021 Arianna Perez, PT GP 1          PT G-Codes  Outcome Measure Options: AM-PAC 6 Clicks Basic Mobility (PT)  AM-PAC 6 Clicks Score (PT): 22  AM-PAC 6 Clicks Score (OT): 14    Arianna Perez PT  10/22/2021

## 2021-10-22 NOTE — PLAN OF CARE
Goal Outcome Evaluation:  Plan of Care Reviewed With: patient        Progress: no change  Outcome Summary: NIH 5. Patient withdrawn and flat, sometimes will not respond to you. BP elevated at 160, hydralazine given per PA. Otherwise VSS. No complaints of pain this shift.  remained at bedside. Awaiting rehab referrals and placement.

## 2021-10-22 NOTE — PLAN OF CARE
Goal Outcome Evaluation:  Plan of Care Reviewed With: patient, spouse        Progress: improving     Pt discharging to [facility] for rehab

## 2021-10-22 NOTE — DISCHARGE INSTR - APPOINTMENTS
Follow up with  (neurology) on November 15, 2021 at 2:20. Please bring insurance card and photo ID. [phone]    If you do not hear back from PCP office by Tuesday, please call them and remind them that you need a hospital follow-up in 3-7 days from now. US AVELINA

## 2021-10-22 NOTE — DISCHARGE SUMMARY
[facility]         HOSPITALIST  DISCHARGE SUMMARY    Patient Name: Kimber Carranza  : 1950  MRN: [id number]    Date of Admission: 10/18/2021  Date of Discharge:  10/22/2021    Primary Care Physician: Kelsie Maier APRN    Consults     Date and Time Order Name Status Description    10/18/2021  3:13 PM Inpatient Intensivist Consult Completed     10/18/2021  3:13 PM Inpatient Neurology Consult Stroke      10/18/2021  3:13 PM Inpatient Neurosurgery Consult Completed     10/18/2021 12:31 PM Hospitalist (on-call MD unless specified) Completed     10/18/2021 12:31 PM Neurosurgery (on-call MD unless specified) Completed           Active and Resolved Hospital Problems:  Acute hemorrhagic stroke  Intraparenchymal hematomas in the posterior right cerebral hemisphere; occipital, temporal,  Subarachnoid hemorrhage bilateral parietal lobes, inferior right temporal  Hypertensive crisis  Essential hypertension; uncontrolled  Diabetes mellitus  Dyslipidemia  GERD without esophagitis  Active Hospital Problems    Diagnosis POA   • Intraparenchymal hemorrhage of brain (HCC) [I61.9] Yes      Resolved Hospital Problems   No resolved problems to display.       Hospital Course     Hospital Course:  71-year-old female with history of essential hypertension, diabetes mellitus, mixed hyperlipidemia, presented to the emergency room with chief complaint of altered mental status.  Found to have hypertensive crisis, head CT revealed intraparenchymal hemorrhage in the right occipital lobe, right temporal lobe, with subarachnoid hemorrhage within both parietal lobes and inferior right temporal lobe.  Neurosurgery was consulted no further procedures warranted, repeat imaging revealed stability, hospital service requested to admit and further manage.  MRI brain showing similar findings to head CT, repeat head CT showing associated mass-effect effaces in the right ventricular atrium and occipital horn, mentation  improved, as did function, vision loss left lateral side remained.  Echo obtained, left ventricular diastolic dysfunction.  She was not started on aspirin due to intracranial bleeding.  She was on an ACE inhibitor, as well as beta-blocker, additional amlodipine 5 mg daily was added for better blood pressure control, despite her elevated triglycerides, she was not placed on a statin medication due to an allergy to statins.  She continued to have left-sided neglect, as well as left lateral vision loss.  As of Oct 22, 2021 she was working well with therapy and seen  hemodynamically stable at time of discharge to [facility] for acute rehab, she is to follow-up with Dr. Guadarrama in 4 to 6 weeks as well as her PCP in 1 week after discharge from rehab.      Day of Discharge     Vital Signs:  Temp:  [97.2 °F (36.2 °C)-98.5 °F (36.9 °C)] 97.2 °F (36.2 °C)  Heart Rate:  [57-67] 62  Resp:  [18] 18  BP: (120-161)/(42-78) 130/64  Review of Systems:  All systems were reviewed and negative except for left-sided neglect, difficulty seeing laterally on her left side, right-sided facial droop, aphasia, difficulty finding the right words.    Physical Exam                         Constitutional: Awake, alert, answering questions, no acute distress sitting at edge of bed              Eyes: Pupils equal, sclerae anicteric, no conjunctival injection              HENT: NCAT, mucous membranes moist, EOMI, difficulty seeing out of her left eye laterally              Neck: Supple, full range of motion              Respiratory: Clear to auscultation bilaterally, nonlabored respirations               Cardiovascular: RRR, no murmurs, rubs, or gallops, palpable pedal pulses bilaterally              Gastrointestinal: Positive bowel sounds, soft, nontender, nondistended              Musculoskeletal: No bilateral ankle edema, no clubbing or cyanosis to extremities              Psychiatric: Appropriate affect, cooperative              Neurologic:  Oriented x 3, strength symmetric in all extremities, word finding difficulty at times, cranial Nerves grossly intact to confrontation with slight left lateral visual loss, speech clear, left-sided neglect              Skin: No rashes         Discharge Details        Discharge Medications      New Medications      Instructions Start Date   amLODIPine 5 MG tablet  Commonly known as: NORVASC   5 mg, Oral, Every 24 Hours Scheduled   Start Date: October 23, 2021     pantoprazole 40 MG EC tablet  Commonly known as: PROTONIX   40 mg, Oral, Every Early Morning   Start Date: October 23, 2021        Changes to Medications      Instructions Start Date   lisinopril 40 MG tablet  Commonly known as: PRINIVIL,ZESTRIL  What changed:   · medication strength  · how much to take  · when to take this   40 mg, Oral, Every 24 Hours Scheduled   Start Date: October 23, 2021     metoprolol succinate XL 50 MG 24 hr tablet  Commonly known as: TOPROL-XL  What changed: when to take this   50 mg, Oral, Every 12 Hours Scheduled         Continue These Medications      Instructions Start Date   cholecalciferol 25 MCG (1000 UT) tablet  Commonly known as: VITAMIN D3   1,000 Units, Oral, Daily      fish oil 1000 MG capsule capsule   1,200 mg, Oral, Daily With Breakfast      metFORMIN 1000 MG tablet  Commonly known as: GLUCOPHAGE   1,000 mg, Oral, 2 times daily         Stop These Medications    hydroCHLOROthiazide 12.5 MG capsule  Commonly known as: MICROZIDE            Allergies   Allergen Reactions   • Statins GI Intolerance       Discharge Disposition:  Rehab Facility or Unit (DC - External)    Diet:  Hospital:  Diet Order   Procedures   • Diet Regular; GI Soft, Consistent Carbohydrate       Discharge Activity: as tolerates      CODE STATUS:  Code Status and Medical Interventions:   Ordered at: 10/18/21 1322     Code Status:    CPR     Medical Interventions (Level of Support Prior to Arrest):    Full         Future Appointments   Date Time Provider  Department Center   3/4/2022 10:45 AM Clifford Donald MD [facility] [place] [facility]       Additional Instructions for the Follow-ups that You Need to Schedule     Discharge Follow-up with PCP   As directed       Currently Documented PCP:    Kelsie Maier APRN    PCP Phone Number:    [phone]     Follow Up Details: 3 to 7 days         Discharge Follow-up with Specified Provider: Dr. Guadarrama in 4-6 weeks   As directed      To: Dr. Guadarrama in 4-6 weeks               Pertinent  and/or Most Recent Results     PROCEDURES:   Adult Transthoracic Echo Complete W/ Cont if Necessary Per Protocol (With Agitated Saline)    Result Date: 10/19/2021  · Calculated left ventricular EF = 70% Estimated left ventricular EF was in agreement with the calculated left ventricular EF. · Left ventricular diastolic function is consistent with (grade I) impaired relaxation. · No hemodynamically significant valvular pathology.      CT Head Without Contrast    Result Date: 10/19/2021  PROCEDURE: CT HEAD WO CONTRAST  COMPARISON:  [facility], CT, CT HEAD WO CONTRAST STROKE PROTOCOL, 10/18/2021, 10:44.  [facility], MR, MRI BRAIN WO CONTRAST, 10/18/2021, 21:14.  [facility], CT, CT ANGIOGRAM HEAD, 10/18/2021, 14:15. INDICATIONS: Neuro deficit, acute, stroke suspected,previous bleed from yesterday,increased drowsiness this A.M.  PROTOCOL:   Standard imaging protocol performed    RADIATION:   DLP: 1070mGy*cm   MA and/or KV was adjusted to minimize radiation dose.     TECHNIQUE: After obtaining the patient's consent, CT images were obtained without non-ionic intravenous contrast material.  FINDINGS:  In the right temporal occipital lobe is an intraparenchymal hemorrhage measuring 4.5 cm AP x 1.8 cm transverse, similar to the previous exam.  Surrounding edema is noted.  Subarachnoid hemorrhage is noted bilaterally, similar to the previous exam.  There is moderate mass-effect in the right temporal occipital  region with effacement of the right ventricular atrium and occipital horn.  An intraparenchymal hemorrhage in the posterior right parietal lobe measuring 2.0 cm appears stable in the interval.  Mild surrounding edema is noted.  The visualized extracranial soft tissues appear normal.  No focal calvarial abnormality is seen.  CONCLUSION:  1. Intraparenchymal hemorrhages in the posterior right parietal and right temporal occipital lobes, similar to the previous exam.  Associated mass effect effaces the right ventricular atrium and occipital horn, unchanged in the 2. Bilateral subarachnoid hemorrhage, similar to the previous exam.     Rupesh Holcomb M.D.       Electronically Signed and Approved By: Rupesh Holcomb M.D. on 10/19/2021 at 8:08             MRI Brain Without Contrast    Result Date: 10/18/2021  PROCEDURE: MRI BRAIN WO CONTRAST  COMPARISON: [facility], CT, CT HEAD WO CONTRAST STROKE PROTOCOL, 10/18/2021, 10:44.  INDICATIONS: Stroke, follow up      TECHNIQUE: A variety of imaging planes and parameters were utilized for visualization of suspected pathology.  Images were performed without contrast.  FINDINGS:  Intraparenchymal hemorrhage in the right occipital lobe measures 2.1 cm, 2.5 cm in the previous head CT.  A hemorrhage in the right temporal lobe measures 5.5 x 2.3 cm, 5.0 x 1.6 cm on the head CT.  Subarachnoid hemorrhage is seen in the occipital lobes with extension into the temporal lobes.  Suspicion for a thin sub dural collection along the right parietal lobe.  The amount of subdural hemorrhage is possibly increased.  No midline shift.  No hydrocephalus.  CONCLUSION: Intraparenchymal hemorrhage in the right occipital lobe is similar to slightly smaller.  Hemorrhage in the right temporal lobe measures slightly larger.  Size differences may be related to differences in technique.  Subarachnoid hemorrhage as above, slightly increased, particularly on the right.  There may also be a thin  subdural collection on the right, not clearly seen on the comparison head CT.  No midline shift or hydrocephalus.      RANDY COREY MD       Electronically Signed and Approved By: RANDY COREY MD on 10/18/2021 at 21:28             XR Chest 1 View    Result Date: 10/18/2021  PROCEDURE: XR CHEST 1 VW  COMPARISON: [facility], CR, CXR PORTABLE, 8/19/2015, 2:22.  INDICATIONS: Stroke Protocol (Onset > 12 hrs)  FINDINGS:   The lungs are well-expanded. The heart and pulmonary vasculature are within normal limits. No pleural effusions are identified. There are no active appearing infiltrates.  No evidence of pneumothorax.  Scoliosis is present.  IMPRESSION: No active disease.   OMKAR PONCE MD       Electronically Signed and Approved By: OMKAR PONCE MD on 10/18/2021 at 11:23             CT Angiogram Head    Result Date: 10/18/2021  PROCEDURE: CT ANGIOGRAM HEAD  COMPARISON: [facility], CT, CT HEAD WO CONTRAST STROKE PROTOCOL, 10/18/2021, 10:44.  INDICATIONS: Intracranial hemorrhage stroke  PROTOCOL:   Standard imaging protocol performed    RADIATION:   DLP: 220.1mGy*cm   MA and/or KV was adjusted to minimize radiation dose.    CONTRAST: 100cc Isovue 370 I.V.  TECHNIQUE: After obtaining the patient's consent, CT images of the head were obtained without and with non-ionic contrast, and multi-planar/3-D imaging were created and interpreted to optimize visualization of vascular anatomy.   FINDINGS:   The intracranial portion of the internal carotid arteries, major MCA and SHAHEEN branches are patent.  The distal vertebral arteries are patent.  The basilar artery and posterior cerebral arteries are patent.  The basilar artery is hypoplastic.  The right vertebral artery is dominant.  The left vertebral artery ends in a PICA branch.  No evidence of large vessel occlusion, high-grade focal stenosis, dissection or aneurysm.  Stable appearing intraparenchymal hematomas in the right temporal, parietal  and occipital regions.  No evidence of midline shift.  No vascular malformations are identified.  IMPRESSION: 1. No evidence of large vessel occlusion, focal high-grade stenosis, dissection or aneurysm.  2. Stable intraparenchymal hematomas in the posterior right cerebral hemisphere as seen on the recent CT scan of the head.  No vascular malformations are identified.  OMKAR PONCE MD       Electronically Signed and Approved By: OMKAR PONCE MD on 10/18/2021 at 15:12             CT Head Without Contrast Stroke Protocol    Result Date: 10/18/2021  PROCEDURE: CT HEAD WO CONTRAST STROKE PROTOCOL  COMPARISON:  None INDICATIONS: AMS CHANGES WITH STROKE INTERVENTION  PROTOCOL:   Standard imaging protocol performed    RADIATION:   DLP: 1017.2mGy*cm   MA and/or KV was adjusted to minimize radiation dose.     TECHNIQUE: After obtaining the patient's consent, CT images were obtained without non-ionic intravenous contrast material.  FINDINGS:  There is a focal area of intraparenchymal hemorrhage present within the right occipital measuring up to 2.5 cm.  Intraparenchymal hemorrhage is present within the right temporal lobe measuring up to 5 cm in AP dimension and up to 1.6 cm in transverse dimension.  Subarachnoid hemorrhage is seen within the parietotemporal regions bilaterally.  Subarachnoid hemorrhage is also seen with extending within the inferior right temporal lobe.  No subdural collections identified.  Mild mass effect is present.  No evidence of midline shift.. The ventricular system is nondilated. The basilar cisterns are patent. The skull is intact without displaced fracture. The paranasal sinuses and mastoid air cells demonstrate no air fluid levels.  CONCLUSION: Focal areas of intraparenchymal hemorrhage present within the right occipital lobe as well as the right temporal lobe with subarachnoid hemorrhage seen within the bilateral parietal lobes and the inferior right temporal lobe.  No subdural collections  identified.  The above findings were discussed with Dr. Taylor 1055 hours on 10/18/2021.      SLICK MOON MD       Electronically Signed and Approved By: SLICK MOON MD on 10/18/2021 at 10:57               LAB RESULTS:      Lab 10/22/21  0510 10/21/21  0529 10/20/21  0341 10/19/21  0235 10/18/21  1100   WBC 11.09* 8.08 10.53 10.29 10.65   HEMOGLOBIN 13.6 12.1 12.7 13.6 13.9   HEMATOCRIT 41.2 37.5 39.0 41.8 41.4   PLATELETS 234 183 195 186 204   NEUTROS ABS 7.49* 4.83 6.98 7.15* 8.38*   IMMATURE GRANS (ABS) 0.04 0.03 0.03 0.03 0.05   LYMPHS ABS 2.46 2.41 2.49 2.16 1.57   MONOS ABS 0.84 0.65 0.92* 0.85 0.53   EOS ABS 0.22 0.12 0.08 0.06 0.08   MCV 86.4 89.1 87.1 88.4 86.8   PROTIME  --   --   --   --  10.9         Lab 10/22/21  0510 10/21/21  0529 10/20/21  0343 10/19/21  0235 10/18/21  1306 10/18/21  1301   SODIUM 135* 136 134* 136  --  136   POTASSIUM 4.1 4.2 4.2 3.7  --  4.0   CHLORIDE 101 103 99 97*  --  98   CO2 21.9* 19.2* 24.6 26.0  --  25.3   ANION GAP 12.1 13.8 10.4 13.0  --  12.7   BUN 14 12 10 11  --  14   CREATININE 0.81 0.87 0.96 0.92 0.80 0.95   GLUCOSE 114* 120* 140* 179*  --  177*   CALCIUM 8.9 8.6 8.7 8.8  --  9.3   MAGNESIUM 2.2 2.1 2.1 1.8  --   --    PHOSPHORUS 2.9 2.9 3.0 2.9  --   --    HEMOGLOBIN A1C  --   --   --  7.30*  --   --          Lab 10/22/21  0510 10/21/21  0529 10/20/21  0343 10/19/21  0235 10/18/21  1301   TOTAL PROTEIN 6.6 6.2 6.2 6.4 7.2   ALBUMIN 3.70 3.50 3.60 4.00 4.40   GLOBULIN  --   --   --   --  2.8   ALT (SGPT) 20 19 25 35* 43*   AST (SGOT) 17 16 20 25 38*   BILIRUBIN 0.5 0.5 0.7 0.5 0.5   INDIRECT BILIRUBIN  --  0.3 0.5 0.3  --    BILIRUBIN DIRECT <0.2 0.2 0.2 0.2  --    ALK PHOS 47 45 46 54 56         Lab 10/22/21  1100 10/18/21  1100   TROPONIN T <0.010  --    PROTIME  --  10.9   INR  --  0.99*         Lab 10/19/21  0235   CHOLESTEROL 162   LDL CHOL 90   HDL CHOL 38*   TRIGLYCERIDES 202*         Lab 10/18/21  1228 10/18/21  1108 10/18/21  1108   ABO TYPING A   < > A   RH  TYPING Positive   < > Positive   ANTIBODY SCREEN  --   --  Negative    < > = values in this interval not displayed.         Brief Urine Lab Results     None        Microbiology Results (last 10 days)     ** No results found for the last 240 hours. **          Results for orders placed during the hospital encounter of 10/18/21    Adult Transthoracic Echo Complete W/ Cont if Necessary Per Protocol (With Agitated Saline)    Interpretation Summary  · Calculated left ventricular EF = 70% Estimated left ventricular EF was in agreement with the calculated left ventricular EF.  · Left ventricular diastolic function is consistent with (grade I) impaired relaxation.  · No hemodynamically significant valvular pathology.      Labs Pending at Discharge: as tolerates        Time spent on Discharge including face to face service:  35 minutes    Electronically signed by Morenita Lovett MD, 10/22/21, 1:52 PM EDT.

## 2021-10-23 ENCOUNTER — NURSE TRIAGE (OUTPATIENT)
Dept: CALL CENTER | Facility: HOSPITAL | Age: 71
End: 2021-10-23

## 2021-10-23 NOTE — TELEPHONE ENCOUNTER
"[facility] and Epic records reviewed.  All prescriptions sent to [facility] in [place], [place].    Reason for Disposition  • Health Information question, no triage required and triager able to answer question    Additional Information  • Negative: [1] Caller is not with the adult (patient) AND [2] reporting urgent symptoms  • Negative: Lab result questions  • Negative: Medication questions  • Negative: Caller can't be reached by phone  • Negative: Caller has already spoken to PCP or another triager  • Negative: RN needs further essential information from caller in order to complete triage  • Negative: Requesting regular office appointment  • Negative: [1] Caller requesting NON-URGENT health information AND [2] PCP's office is the best resource    Answer Assessment - Initial Assessment Questions  1. REASON FOR CALL or QUESTION: \"What is your reason for calling today?\" or \"How can I best help you?\" or \"What question do you have that I can help answer?\"      I have a question about my wife's discharge instructions.    Protocols used: INFORMATION ONLY CALL - NO TRIAGE-ADULT-      "

## 2021-10-26 LAB — QT INTERVAL: 460 MS

## 2021-11-01 ENCOUNTER — TRANSCRIBE ORDERS (OUTPATIENT)
Dept: PHYSICAL THERAPY | Facility: CLINIC | Age: 71
End: 2021-11-01

## 2021-11-01 DIAGNOSIS — I63.9 CEREBROVASCULAR ACCIDENT (CVA), UNSPECIFIED MECHANISM (HCC): Primary | ICD-10-CM

## 2021-11-08 ENCOUNTER — OFFICE VISIT (OUTPATIENT)
Dept: PHYSICAL THERAPY | Facility: CLINIC | Age: 71
End: 2021-11-08

## 2021-11-08 DIAGNOSIS — R41.841 COGNITIVE COMMUNICATION DEFICIT: Primary | ICD-10-CM

## 2021-11-08 PROCEDURE — 96125 COGNITIVE TEST BY HC PRO: CPT | Performed by: SPEECH-LANGUAGE PATHOLOGIST

## 2021-11-15 ENCOUNTER — TREATMENT (OUTPATIENT)
Dept: PHYSICAL THERAPY | Facility: CLINIC | Age: 71
End: 2021-11-15

## 2021-11-15 DIAGNOSIS — R41.841 COGNITIVE COMMUNICATION DEFICIT: Primary | ICD-10-CM

## 2021-11-15 PROCEDURE — 97130 THER IVNTJ EA ADDL 15 MIN: CPT | Performed by: SPEECH-LANGUAGE PATHOLOGIST

## 2021-11-15 PROCEDURE — 97129 THER IVNTJ 1ST 15 MIN: CPT | Performed by: SPEECH-LANGUAGE PATHOLOGIST

## 2021-11-15 NOTE — PROGRESS NOTES
Outpatient Speech Language Pathology   Adult Speech Language Cognitive Treatment Note       Patient Name: Kimber Carranza  : 1950  MRN: [id number]  Today's Date: 11/15/2021         Visit Date: 11/15/2021         Today Visit number: 2  Medical Diagnosis:CVA  Treatment Diagnosis:Cognitive  Communication Deficits      SUBJECTIVE  Patient Comments:  Patient indicates she is fatigued and experiencing a period of depression today.      Education:   Educated on plan of care, attention and how it affects memory, stroke      OBJECTIVE  Patient is here today to increase memory recall and attention for safe completion of IADLs.           SPEECH THERAPY    GOALS ACTIVITY PERFORMED TRIALS COMPLETED LEVEL OF CUEING REQUIRED   STG 1a: 8 weeks: Patient will demonstrate the ability to use appropriate memory strategies to encode novel and complex information with men cueing.   STATUS:  New                STG 1b: 8 weeks:  Patient will complete mod complex short term memory tasks with 80% accuracy and/or min assist for strategy use.    STATUS: New            STG 1c: 8 weeks: Patient will complete immediate memory tasks with 80% accuracy and min assist for strategy use.    STATUS: New                      STG 2a: 8 weeks.  Patient will complete visual attention tasks with min assist to attend to multiple information required to make appropriate and timely decisions for ADLS.   STATUS: New       Visual attention- sustained, and divided BITS program    Trailmaking  A: connecting numbers 1-25 completed in 1 minute, 1 second with 0 errors and 3 interuptions  B: Connecting number to letter to next number to next letter- 1minute 25 seconds with 0 errors and 4 interuptions    Bell cancellation task: 2 minutes 52 seconds 7 misses and 5 distractions (scanning up and down) Unorganized scanning with visual attention wandering to the right.    Taylor cancellation task trial 2 (scanning left to right) 5 minutes 14 seconds with 1 miss and  1 distraction    Mazes- 1 minute and 19 seconds with no errors and good visual attention    Memory strings presented verbally and visually with max of 5 objects 88.89% accuracy      Moderate assist for visual attendtion                             ASSESSMENT/PLAN  Need for skilled care:  Patient requires the skills of a therapist to provide mod assist for visual attention strategies to encode information for safe completion of IADLs.     Progress towards goals: progressing    Barriers to Achieving Goals:  Left neglect    Changes to Plan:  Continue plan of care      Time: 09:50-10:44=54 minutes         Reta Scruggs MS, CCC-SLP

## 2021-11-19 ENCOUNTER — TREATMENT (OUTPATIENT)
Dept: PHYSICAL THERAPY | Facility: CLINIC | Age: 71
End: 2021-11-19

## 2021-11-19 DIAGNOSIS — I63.9 CEREBROVASCULAR ACCIDENT (CVA), UNSPECIFIED MECHANISM (HCC): Primary | ICD-10-CM

## 2021-11-19 DIAGNOSIS — R41.841 COGNITIVE COMMUNICATION DEFICIT: Primary | ICD-10-CM

## 2021-11-19 PROCEDURE — 97130 THER IVNTJ EA ADDL 15 MIN: CPT | Performed by: SPEECH-LANGUAGE PATHOLOGIST

## 2021-11-19 PROCEDURE — 97530 THERAPEUTIC ACTIVITIES: CPT | Performed by: PHYSICAL THERAPIST

## 2021-11-19 PROCEDURE — 97129 THER IVNTJ 1ST 15 MIN: CPT | Performed by: SPEECH-LANGUAGE PATHOLOGIST

## 2021-11-19 PROCEDURE — 97162 PT EVAL MOD COMPLEX 30 MIN: CPT | Performed by: PHYSICAL THERAPIST

## 2021-11-19 NOTE — PROGRESS NOTES
"  Outpatient Speech Language Pathology   Adult Speech Language Cognitive Treatment Note       Patient Name: Kimber Carranza  : 1950  MRN: [id number]  Today's Date: 2021         Visit Date: 2021         Today Visit number: 3  Medical Diagnosis:CVA  Treatment Diagnosis:Cognitive  Communication Deficits      SUBJECTIVE  Patient Comments:  Patient indicates she noticed visual disturbance on the lower left quadrant while attending to the screen.  She used compensatory strategies of scanning and noticed the deficit.  Additionally, patient is completing visual scanning exercises at home (divided attention task) and it is \"challenging\".     Education:   Educated on memory strategies and given examples.  Patient had ample time to ask questions.  Discussed possible visual therapy with an opthalmologist if visual deficits continue.         OBJECTIVE  Patient is here today to increase memory recall and attention for safe completion of IADLs.           SPEECH THERAPY    GOALS ACTIVITY PERFORMED TRIALS COMPLETED LEVEL OF CUEING REQUIRED   STG 1a: 8 weeks: Patient will demonstrate the ability to use appropriate memory strategies to encode novel and complex information with men cueing.   STATUS:  New           Teaching of memory strategies five strategies: write it down, repetition, association, grouping and visualization Max assist to teach and give example   STG 1b: 8 weeks:  Patient will complete mod complex short term memory tasks with 80% accuracy and/or min assist for strategy use.    STATUS: New            STG 1c: 8 weeks: Patient will complete immediate memory tasks with 80% accuracy and min assist for strategy use.    STATUS: New               Short term recall              Immediate recall   Delayed recall of 10 minutes 10/10 with cueing            List of 10 associated words  9/10    When cued for strategy use patient able to recall all 10 words Moderate assist for strategy " use                Moderate assist for strategy use     STG 2a: 8 weeks.  Patient will complete visual attention tasks with min assist to attend to multiple information required to make appropriate and timely decisions for ADLS.   STATUS: New       Visual attention- divided Scanning left to right line of arrows and raising arm the opposite direction of the arrow    Moderate assist for visual attendtion                             ASSESSMENT/PLAN  Need for skilled care:  Patient requires the skills of a therapist to provide mod to max assist to teach memory strategies and moderate assist for  visual attention  to encode information for safe completion of IADLs.     Progress towards goals: progressing    Barriers to Achieving Goals:  Left neglect    Changes to Plan:  Continue plan of care      Time: 11:03-11:48=45 minutes         Reta Scruggs MS, CCC-SLP

## 2021-11-19 NOTE — PROGRESS NOTES
"0  Physical Therapy Initial Evaluation and Plan of Care    Patient: Kimber Carranza   : 1950  Diagnosis/ICD-10 Code:  Cerebrovascular accident (CVA), unspecified mechanism (HCC) [I63.9]  Referring practitioner: Scooby Calderon MD  Date of Initial Visit: 2021  Today's Date: 2021  Patient seen for 1 sessions           Subjective Questionnaire: DGI: TOTAL SCORE: __20_ / 24      Subjective Evaluation    History of Present Illness  Mechanism of injury: Pt suffered CVA 10/18/21, presented to ED from previous notes: \"head CT revealed intraparenchymal hemorrhage in the right occipital lobe, right temporal lobe, with subarachnoid hemorrhage within both parietal lobes and inferior right temporal lobe.\" Discharged from acute care after 6 days, then went to [facility]. Now back at home where she lives with her , does have a basement, but does not go down there much. Two steps to enter. She is no longer using an assistive device. Reports having had more weakness on her left side, but she has already improved since being discharged home.      Pain  Current pain ratin  At best pain ratin  At worst pain ratin  Aggravating factors: standing    Social Support  Lives in: multiple-level home  Lives with: spouse    Hand dominance: right    Treatments  Discharged from (in last 30 days): inpatient hospitalization and skilled nursing facility  Patient Goals  Patient goals for therapy: improved balance, increased motion, increased strength and independence with ADLs/IADLs       PMH: diabetes, HTN, CVA      Objective          Strength/Myotome Testing     Left Shoulder     Planes of Motion   Flexion: 4   Abduction: 4+   External rotation at 0°: 4     Right Shoulder     Planes of Motion   Flexion: 4+   Abduction: 4+   External rotation at 0°: 4+     Left Hip   Planes of Motion   Flexion: 4  Extension: 4  Abduction: 4  Adduction: 4+    Right Hip   Planes of Motion   Flexion: " 4+  Extension: 4  Abduction: 4+  Adduction: 4+    Left Knee   Flexion: 4+  Extension: 4+    Right Knee   Flexion: 4+  Extension: 5    Left Ankle/Foot   Dorsiflexion: 4-  Plantar flexion: 4    Right Ankle/Foot   Dorsiflexion: 4  Plantar flexion: 4+    Tests     Additional Tests Details  Coordination: alternating hands minimal deviation bilaterally    Nose-Finger-Nose: minimal deviation from hitting target bilaterally  Coordination: Heel-Knee-Shin minimal deviation bilaterally    Toe Tap: slightly out of rhythm on left with increased speed    Ambulation     Comments   Decreased trunk dissociation with decreased left arm swing during ambulation    Functional Assessment     Comments  Dynamic Gait Index Score Sheet  (Adapted from [employer] Motor Control: Theory and Practical Applications, 1995)      1. Gait level surface __3___  Instructions: Walk at your normal speed from here to the next aravind (20’)    Grading: Aravind the lowest category that applies.  (3) Normal: Walks 20’, no assistive devices, good speed, no evidence for imbalance, normal gait pattern.  (2) Mild Impairment: Walks 20’, uses assistive devices, slower speed, mild gait deviations.  (1) Moderate Impairment: Walks 20’, slow speed, abnormal gait pattern, evidence for imbalance.  (0) Severe Impairment: Cannot walk 20’ without assistance, severe gait deviations or imbalance.      2. Change in gait speed __2___  Instructions: Begin walking at your normal pace (for 5’), when I tell you “go,” walk as fast as you can (for 5’). When I tell you “slow,” walk as slowly as  you can (for 5’).    Grading: Aravind the lowest category that applies.  (3) Normal: Able to smoothly change walking speed without loss of balance or gait deviation. Shows a significant difference in walking speeds  between normal, fast and slow speeds.  (2) Mild Impairment: Is able to change speed but demonstrates mild gait deviations, or no gait deviations but is unable to achieve a  significant  change in velocity, or uses an assistive device.  (1) Moderate Impairment: Makes only minor adjustments to walking speed, or accomplishes a change in speed with significant gait deviations, or   changes speed but loses significant gait deviations, or changes speed but loses balance but is able to recover and continue walking.  (0) Severe Impairment: Cannot change speeds, or loses balance and has to reach for wall or be caught.        3. Gait with horizontal head turns __2___  Instructions: Begin walking at your normal pace. When I tell you to “look right,” keep walking straight, but turn your head to the right. Keep looking to  the right until I tell you, “look left,” then keep walking straight and turn your head to the left. Keep your head to the left until I tell you “look straight,“   then keep walking straight, but return your head to the center.    Grading: Aravind the lowest category that applies.  (3) Normal: Performs head turns smoothly with no change in gait.  (2) Mild Impairment: Performs head turns smoothly with slight change in gait velocity, i.e., minor disruption to smooth gait path or uses walking aid.  (1) Moderate Impairment: Performs head turns with moderate change in gait velocity, slows down, staggers but recovers, can continue to walk.  (0) Severe Impairment: Performs task with severe disruption of gait, i.e., staggers, outside 15” path, loses balance, stops, reaches for wall.        4. Gait with vertical head turns __3___  Instructions: Begin walking at your normal pace. When I tell you to “look up,” keep walking straight, but tip your head up. Keep looking up until I tell  you, “look down,” then keep walking straight and tip your head down. Keep your head down until I tell you “look straight,“ then keep walking straight,  but return your head to the center.    Grading: Aravind the lowest category that applies.  (3) Normal: Performs head turns smoothly with no change in gait.  (2) Mild  Impairment: Performs task with slight change in gait velocity, i.e., minor disruption to smooth gait path or uses walking aid.  (1) Moderate Impairment: Performs task with moderate change in gait velocity, slows down, staggers but recovers, can continue to walk.  (0) Severe Impairment: Performs task with severe disruption of gait, i.e., staggers, outside 15” path, loses balance, stops, reaches for wall.        5. Gait and pivot turn __2___  Instructions: Begin walking at your normal pace. When I tell you, “turn and stop,” turn as quickly as you can to face the opposite direction and stop.    Grading: Aravind the lowest category that applies.  (3) Normal: Pivot turns safely within 3 seconds and stops quickly with no loss of balance.  (2) Mild Impairment: Pivot turns safely in > 3 seconds and stops with no loss of balance.  (1) Moderate Impairment: Turns slowly, requires verbal cueing, requires several small steps to catch balance following turn & stop.  (0) Severe Impairment: Cannot turn safely, requires assistance to turn and stop.       6. Step over obstacle __3__  Instructions: Begin walking at your normal speed. When you come to the shoebox, step over it, not around it, and keep walking.    Grading: Aravind the lowest category that applies.  (3) Normal: Is able to step over the box without changing gait speed, no evidence of imbalance.  (2) Mild Impairment: Is able to step over box, but must slow down and adjust steps to clear box safely.  (1) Moderate Impairment: Is able to step over box but must stop, then step over. May require verbal cueing.  (0) Severe Impairment: Cannot perform without assistance.        7. Step around obstacles __2___  Instructions: Begin walking at normal speed. When you come to the first cone (about 6’ away), walk around the right side of it. When you come to the  second cone (6’ past first cone), walk around it to the left.    Grading: Aravind the lowest category that applies.  (3) Normal: Is  able to walk around cones safely without changing gait speed; no evidence of imbalance.  (2) Mild Impairment: Is able to step around both cones, but must slow down and adjust steps to clear cones.  (1) Moderate Impairment: Is able to clear cones but must significantly slow speed to accomplish task, or requires verbal cueing.  (0) Severe Impairment: Unable to clear cones, walks into one or both cones, or requires physical assistance.        8. Steps __2___  Instructions: Walk up these stairs as you would at home, i.e., using the railing if necessary. At the top, turn around and walk down.    Grading: Aravind the lowest category that applies.  (3) Normal: Alternating feet, no rail.  (2) Mild Impairment: Alternating feet, must use rail.  (1) Moderate Impairment: Two feet to a stair, must use rail.  (0) Severe Impairment: Cannot do safely.        TOTAL SCORE: __20_ / 24    Scoring Information: 21/24 or above = minimal to no risk for falls  Below 21 indicates risk for falls and the lower the score the more the risk      See Exercise, Manual, and Modality Logs for complete treatment.     Assessment & Plan     Assessment  Impairments: abnormal coordination, abnormal gait, activity intolerance, impaired balance, impaired physical strength and lacks appropriate home exercise program  Assessment details: The patient presents to physical therapy with complaints of decreased strength and balance post CVA. The patient presents with associated left lower extremity weakness, balance deficits, and functional deficits (DGI). The patient would benefit from skilled PT intervention to address the above mentioned functional limitations.  Functional Limitations: carrying objects, lifting, walking and standing  Goals  Plan Goals: Impaired Gross Mobility     Upper Extremity Weakness     1. The patient has limited strength of the left shoulder.   LTG 1: 12 weeks:  The patient will demonstrate 5 /5 strength for left shoulder flexion, abduction,  external rotation, and internal rotation in order to demonstrate improved shoulder stability.   STATUS:  New   STG 1a: 4 weeks:  The patient will demonstrate 4+/5 strength for left shoulder flexion, abduction, external rotation, and internal rotation.   STATUS:  New   STG 1b:  4 weeks:  The patient will be compliant with HEP (with or without assistant from caregiver) at least 3 days per week in order to improve strength and function of upper extremity.  STATUS:  New   TREATMENT: Manual therapy, therapeutic exercise, home exercise instruction, and modalities as needed to include: electrical stimulation, ultrasound, moist heat, and ice.     2. The patient demonstrates weakness of the left lower extremity    LTG 2: 12 weeks:  The patient will demonstrate overall left hip strength of 4+/5 in order to improve hip stability and promote improved balance during gait.   STATUS:  New   STG 2a: 6 weeks:  The patient will demonstrate 4/5 strength for left hip flexion, abduction,   and extension.   STATUS:  New   STG 2b: 6 weeks:  The patient will be compliant with HEP (with or without assistant from caregiver) at least 3 days per week in order to improve strength and function.   STATUS:  New   TREATMENT: Therapeutic exercises, manual therapy, aquatic therapy, home exercise instruction, and modalities as needed for pain to include:  electrical stimulation.    4. The patient has decreased ability to perform functional ambulation maintaining balance.  LTG 4: 12 weeks:  The patient will ambulate with demonstrate >22/24 on DGI.  STATUS: New   STG 4a: 6 weeks: The patient will ambulate with demonstrate >20/24 on DGI.  STATUS:  New   TREATMENT: Gait training, therapeutic activities, therapeutic exercise, neuromuscular re-education            Plan  Therapy options: will be seen for skilled physical therapy services  Planned modality interventions: TENS, cryotherapy and electrical stimulation/Russian stimulation  Planned therapy  interventions: manual therapy, therapeutic activities, neuromuscular re-education, home exercise program, gait training, ADL retraining, stretching and strengthening  Frequency: 3x week  Duration in weeks: 12  Treatment plan discussed with: patient and caregiver        Visit Diagnoses:    ICD-10-CM ICD-9-CM   1. Cerebrovascular accident (CVA), unspecified mechanism (HCC)  I63.9 434.91       History # of Personal Factors and/or Comorbidities: HIGH (3+)  Examination of Body System(s): # of elements: MODERATE (3)  Clinical Presentation: EVOLVING  Clinical Decision Making: MODERATE      Timed:         Manual Therapy:    0     mins  46060;     Therapeutic Exercise:    0     mins  61478;     Neuromuscular Emmy:    0    mins  14169;    Therapeutic Activity:     10     mins  77006;     Gait Trainin     mins  76240;     Ultrasound:     0     mins  21881;    Ionto                               0    mins   44820  Self Care                       0     mins   86115  Canalith Repos    0     mins 68154      Un-Timed:  Electrical Stimulation:    0     mins  59302 (MC );  Dry Needling     0     mins self-pay  Traction     0     mins 49867  Low Eval     0     Mins  69483  Mod Eval     40     Mins  04829  High Eval                       0     Mins  89898  Re-Eval                           0    mins  23481    Timed Treatment:   10   mins   Total Treatment:     50   mins    PT SIGNATURE: Silvestre Jean PT     Electronically signed 2021    [place] License: PT - [id number]     Initial Certification  Certification Period: 2021 thru 2022  I certify that the therapy services are furnished while this patient is under my care.  The services outlined above are required by this patient, and will be reviewed every 90 days.     PHYSICIAN: Scooby Calderon MD      DATE:     Please sign and return via fax to [phone]. Thank you, [facility].

## 2021-11-23 ENCOUNTER — TREATMENT (OUTPATIENT)
Dept: PHYSICAL THERAPY | Facility: CLINIC | Age: 71
End: 2021-11-23

## 2021-11-23 DIAGNOSIS — R41.841 COGNITIVE COMMUNICATION DEFICIT: Primary | ICD-10-CM

## 2021-11-23 PROCEDURE — 97130 THER IVNTJ EA ADDL 15 MIN: CPT | Performed by: SPEECH-LANGUAGE PATHOLOGIST

## 2021-11-23 PROCEDURE — 97129 THER IVNTJ 1ST 15 MIN: CPT | Performed by: SPEECH-LANGUAGE PATHOLOGIST

## 2021-11-23 NOTE — PROGRESS NOTES
Outpatient Speech Language Pathology   Adult Speech Language Cognitive Treatment Note       Patient Name: Kimber Carranza  : 1950  MRN: [id number]  Today's Date: 2021         Visit Date: 2021         Today Visit number: 4  Medical Diagnosis:CVA  Treatment Diagnosis:Cognitive  Communication Deficits      SUBJECTIVE  Patient Comments:      Education: Use of association and visualization to increase word recall.   OBJECTIVE  Patient is here today to increase memory recall and attention for safe completion of IADLs.           SPEECH THERAPY    GOALS ACTIVITY PERFORMED TRIALS COMPLETED LEVEL OF CUEING REQUIRED   STG 1a: 8 weeks: Patient will demonstrate the ability to use appropriate memory strategies to encode novel and complex information with min cueing.   STATUS:  New           Teaching of memory strategies five strategies: write it down, repetition, association, grouping and visualization Moderate assist to recall the five strategies   STG 1b: 8 weeks:  Patient will complete mod complex short term memory tasks with 80% accuracy and/or min assist for strategy use.    STATUS: New            STG 1c: 8 weeks: Patient will complete immediate memory tasks with 80% accuracy and min assist for strategy use.    STATUS: New               Short term recall              Immediate recall   Delayed recall of 20 sets of words for a total of  71  Words.  Patient used association and visualization to recall 18/20 sets of words (90%)           Immediate recall of 9 sets of 3 words  Immediate recall of 10 sets of 4 words  immediate recall of 1 set of 5 words Min to mod assist                    Min to mod assist     STG 2a: 8 weeks.  Patient will complete visual attention tasks with min assist to attend to multiple information required to make appropriate and timely decisions for ADLS.   STATUS: New                                      ASSESSMENT/PLAN  Need for skilled care:  Patient requires the skills of a  therapist to provide reduced cueing to min to mod  assist to teach memory strategies to encode information for safe completion of IADLs.     Progress towards goals: progressing    Barriers to Achieving Goals:  Left neglect    Changes to Plan:  Continue plan of care      Time: 11:25-12:21=56 minutes         Reta Scruggs MS, CCC-SLP

## 2021-12-03 ENCOUNTER — TREATMENT (OUTPATIENT)
Dept: PHYSICAL THERAPY | Facility: CLINIC | Age: 71
End: 2021-12-03

## 2021-12-03 DIAGNOSIS — R41.841 COGNITIVE COMMUNICATION DEFICIT: Primary | ICD-10-CM

## 2021-12-03 PROCEDURE — 97130 THER IVNTJ EA ADDL 15 MIN: CPT | Performed by: SPEECH-LANGUAGE PATHOLOGIST

## 2021-12-03 PROCEDURE — 97129 THER IVNTJ 1ST 15 MIN: CPT | Performed by: SPEECH-LANGUAGE PATHOLOGIST

## 2021-12-03 NOTE — PROGRESS NOTES
Outpatient Speech Language Pathology   Adult Speech Language Cognitive Treatment Note       Patient Name: Kimber Carranza  : 1950  MRN: [id number]  Today's Date: 12/3/2021         Visit Date: 2021         Today Visit number: 5  Medical Diagnosis:CVA  Treatment Diagnosis:Cognitive  Communication Deficits      SUBJECTIVE  Patient Comments:      Education: attention, stress and mental fatigue and its affect on memory.  OBJECTIVE  Patient is here today to increase memory recall and attention for safe completion of IADLs.           SPEECH THERAPY    GOALS ACTIVITY PERFORMED TRIALS COMPLETED LEVEL OF CUEING REQUIRED   STG 1a: 8 weeks: Patient will demonstrate the ability to use appropriate memory strategies to encode novel and complex information with min cueing.   STATUS:  New           Teaching of memory strategies five strategies: write it down, repetition, association, grouping and visualization Min assist   STG 1b: 8 weeks:  Patient will complete mod complex short term memory tasks with 80% accuracy and/or min assist for strategy use.    STATUS: New            STG 1c: 8 weeks: Patient will complete immediate memory tasks with 80% accuracy and min assist for strategy use.    STATUS: New               Short term recall              Immediate recall   Delayed recall of 6 words after 10  Minutes is 6/6     Delayed recall after 30 minutes 6/6        Immediate auditory memory-  recall of functional paragraphs 3/5 (60%)    Immediate memory (working) - word list retention: category exclusion  Four words 4/5 (80%)  Five words 1/5 (20%) Min to mod assist                     mod assist for strategy use     STG 2a: 8 weeks.  Patient will complete visual attention tasks with min assist to attend to multiple information required to make appropriate and timely decisions for ADLS.   STATUS: New       Visual divided attention Sorting cards  Hearts/spade/discarge -100%  Even hearts and odd spades 1 error Min assist  with timely processing speeds.                              ASSESSMENT/PLAN  Need for skilled care:  Patient requires the skills of a therapist to provide reduced cueing to min to mod  assist to teach memory strategies to encode information for safe completion of IADLs.     Progress towards goals: progressing    Barriers to Achieving Goals:  Left neglect    Changes to Plan:  Continue plan of care      Time: 10:43-11:31=48minutes         Reta Scruggs MS, CCC-SLP

## 2021-12-06 ENCOUNTER — TREATMENT (OUTPATIENT)
Dept: PHYSICAL THERAPY | Facility: CLINIC | Age: 71
End: 2021-12-06

## 2021-12-06 DIAGNOSIS — R41.841 COGNITIVE COMMUNICATION DEFICIT: Primary | ICD-10-CM

## 2021-12-06 PROCEDURE — 97130 THER IVNTJ EA ADDL 15 MIN: CPT | Performed by: SPEECH-LANGUAGE PATHOLOGIST

## 2021-12-06 PROCEDURE — 97129 THER IVNTJ 1ST 15 MIN: CPT | Performed by: SPEECH-LANGUAGE PATHOLOGIST

## 2021-12-06 NOTE — PROGRESS NOTES
"  Outpatient Speech Language Pathology   Adult Speech Language Cognitive Treatment Note       Patient Name: Kimber aCrranza  : 1950  MRN: [id number]  Today's Date: 2021         Visit Date: 2021         Today Visit number: 6  Medical Diagnosis:CVA  Treatment Diagnosis:Cognitive  Communication Deficits      SUBJECTIVE  Patient Comments:  Patient indicates she is completing the immediate memory HEP at home.     Education:           Patient is here today to increase memory recall and attention for safe completion of IADLs.           SPEECH THERAPY    GOALS ACTIVITY PERFORMED TRIALS COMPLETED LEVEL OF CUEING REQUIRED   STG 1a: 8 weeks: Patient will demonstrate the ability to use appropriate memory strategies to encode novel and complex information with min cueing.   STATUS:  New           Teaching of memory strategies five strategies: write it down, repetition, association, grouping and visualization Min assist   STG 1b: 8 weeks:  Patient will complete mod complex short term memory tasks with 80% accuracy and/or min assist for strategy use.    STATUS: New            STG 1c: 8 weeks: Patient will complete immediate memory tasks with 80% accuracy and min assist for strategy use.    STATUS: New               Short term recall              Immediate recall   Delayed recall of 2 doctor appointments:   10  Minutes-     20 Minutes-        Immediate recall of doctor appointments:   7/7     Min  assist                     min assist for strategy use     STG 2a: 8 weeks.  Patient will complete visual attention tasks with min assist to attend to multiple information required to make appropriate and timely decisions for ADLS.   STATUS: New       Visual attention Alternating between letter and number ascending order to connect dots to make a car.    -Connecting the dots-integration (visualize the shape, connect the letters to spell out\" This is for integration of cognitive skills\" .  [facility] project Min " to mod  assist with timely processing speeds                              ASSESSMENT/PLAN  Need for skilled care:  Patient requires the skills of a therapist to provide reduced cueing to min to mod  assist to teach memory strategies to encode information for safe completion of IADLs.     Progress towards goals: progressing    Barriers to Achieving Goals:  Left neglect    Changes to Plan:  Continue plan of care      Time: 12:13- 13:02 = 45minutes         Reta Scruggs MS, CCC-SLP

## 2021-12-08 ENCOUNTER — TREATMENT (OUTPATIENT)
Dept: PHYSICAL THERAPY | Facility: CLINIC | Age: 71
End: 2021-12-08

## 2021-12-08 DIAGNOSIS — R41.841 COGNITIVE COMMUNICATION DEFICIT: Primary | ICD-10-CM

## 2021-12-08 DIAGNOSIS — I63.9 CEREBROVASCULAR ACCIDENT (CVA), UNSPECIFIED MECHANISM (HCC): ICD-10-CM

## 2021-12-08 PROCEDURE — 97112 NEUROMUSCULAR REEDUCATION: CPT | Performed by: PHYSICAL THERAPIST

## 2021-12-08 PROCEDURE — 97110 THERAPEUTIC EXERCISES: CPT | Performed by: PHYSICAL THERAPIST

## 2021-12-08 NOTE — PROGRESS NOTES
Physical Therapy Daily Treatment Note      Patient: Kimber Carranza   : 1950  Referring practitioner: Scooby Calderon MD  Date of Initial Visit: Type: THERAPY  Noted: 2021  Today's Date: 2021  Patient seen for 2 sessions           Subjective Questionnaire:       Subjective Evaluation    History of Present Illness    Subjective comment: Pt presents to physical therapy with report of feeling well.  Pt reported she changed her medicine and feels much better and slept well last night.       Objective   See Exercise, Manual, and Modality Logs for complete treatment.       Assessment & Plan     Assessment    Assessment details: Pt tolerated tx well and had no loss of balance.        Visit Diagnoses:    ICD-10-CM ICD-9-CM   1. Cognitive communication deficit  R41.841 799.52   2. Cerebrovascular accident (CVA), unspecified mechanism (HCC)  I63.9 434.91       Progress per Plan of Care and Progress strengthening /stabilization /functional activity           Timed:  Manual Therapy:         mins  95233;  Therapeutic Exercise:    10     mins  11244;     Neuromuscular Emmy:    18    mins  57531;    Therapeutic Activity:          mins  28222;     Gait Training:           mins  47133;     Ultrasound:          mins  82786;    Electrical Stimulation:         mins  39654 ( );  Aquatic Therapy          mins  09973    Untimed:  Electrical Stimulation:         mins  49816 ( );  Mechanical Traction:         mins  76040;     Timed Treatment:   28   mins   Total Treatment:     28   mins    Electronically signed    Isabell Hill PTA  Physical Therapist Assistant    [place] license: [id number]

## 2021-12-10 ENCOUNTER — TREATMENT (OUTPATIENT)
Dept: PHYSICAL THERAPY | Facility: CLINIC | Age: 71
End: 2021-12-10

## 2021-12-10 DIAGNOSIS — R41.841 COGNITIVE COMMUNICATION DEFICIT: Primary | ICD-10-CM

## 2021-12-10 DIAGNOSIS — I63.9 CEREBROVASCULAR ACCIDENT (CVA), UNSPECIFIED MECHANISM (HCC): Primary | ICD-10-CM

## 2021-12-10 PROCEDURE — 97110 THERAPEUTIC EXERCISES: CPT | Performed by: PHYSICAL THERAPIST

## 2021-12-10 PROCEDURE — 97129 THER IVNTJ 1ST 15 MIN: CPT | Performed by: SPEECH-LANGUAGE PATHOLOGIST

## 2021-12-10 PROCEDURE — 97112 NEUROMUSCULAR REEDUCATION: CPT | Performed by: PHYSICAL THERAPIST

## 2021-12-10 PROCEDURE — 97130 THER IVNTJ EA ADDL 15 MIN: CPT | Performed by: SPEECH-LANGUAGE PATHOLOGIST

## 2021-12-10 NOTE — PROGRESS NOTES
Physical Therapy Daily Progress Note        Patient: Kimber Carranza   : 1950  Diagnosis/ICD-10 Code:  Cerebrovascular accident (CVA), unspecified mechanism (HCC) [I63.9]  Referring practitioner: Scooby Calderon MD  Date of Initial Visit: Type: THERAPY  Noted: 2021  Today's Date: 12/10/2021  Patient seen for 3 sessions             Subjective   Kimber Carranza reports: no falls the last few days.    Objective   General fatigue following obstacle course.     See Exercise, Manual, and Modality Logs for complete treatment.       Assessment/Plan  Pt progressing as evident by decreased falls and increased tolerance of PT session. Pt would benefit from skilled PT to address strength and endurance deficits, transfer and gait training and any concerns with ADLs.     Progress per Plan of Care           Timed:  Manual Therapy:         mins  17621;  Therapeutic Exercise:    15     mins  68960;     Neuromuscular Emmy:    15    mins  23721;    Therapeutic Activity:          mins  61372;     Gait Training:           mins  49412;    Aquatic Therapy:          mins  03095;       Untimed:  Electrical Stimulation:         mins  44565 ( );  Mechanical Traction:         mins  34625;       Timed Treatment:   30   mins   Total Treatment:     30   mins      Electronically signed:   Crystal Jean PTA  Physical Therapist Assistant  [place] CESPEDES License #: [id number]

## 2021-12-10 NOTE — PROGRESS NOTES
Outpatient Speech Language Pathology   Adult Speech Language Cognitive Treatment Note/Progress Note       Patient Name: Kimber Carranza  : 1950  MRN: [id number]  Today's Date: 12/10/2021         Visit Date: 12/10/2021         Today Visit number: 7  Medical Diagnosis:CVA  Treatment Diagnosis:Cognitive  Communication Deficits      SUBJECTIVE  Patient/Therapist Comments:  Patient a little anxious with increased outside competing stimuli.     Education: Plan of care          Patient is here today to increase memory recall and attention for safe completion of IADLs.           SPEECH THERAPY    GOALS ACTIVITY PERFORMED TRIALS COMPLETED LEVEL OF CUEING REQUIRED   STG 1a: 8 weeks: Patient will demonstrate the ability to use appropriate memory strategies to encode novel and complex information with min cueing.   STATUS:  New           Teaching of memory strategies five strategies: write it down, repetition, association, grouping and visualization Min assist   STG 1b: 8 weeks:  Patient will complete mod complex short term memory tasks with 80% accuracy and/or min assist for strategy use.    STATUS: New            STG 1c: 8 weeks: Patient will complete immediate memory tasks with 80% accuracy and min assist for strategy use.    STATUS: New               Short term recall              Immediate recall   Delayed recall of 6 unassociated words:  5/6        Immediate recall of unassociated words:   6/6      Recall of five word strings with prompts presented verbally and visually for five minutes:      Min assist to use visualization, repetition, writing it down and association strategies                     min assist for strategy use     STG 2a: 8 weeks.  Patient will complete visual attention tasks with min assist to attend to multiple information required to make appropriate and timely decisions for ADLS.   STATUS: New       Visual attention Trail making on Bits:  Letter to number: 2:36  to complete with 4  errors  2nd trial: 1:50 to complete reducing errors to 2    Bell Cancellation task on Bits:  5:02 missing one bell with  left to right scanning skills    Attending to verbal and visual stimuli to recall word strings    80.59% accurate with patient complaining of difficulty attending to the left and decreased recall 84% Min to mod assist to attend to both verbal and visual stimuli.                               ASSESSMENT/PLAN  Need for skilled care:  Patient requires the skills of a therapist to provide reduced cueing to min to mod  assist to teach memory strategies to encode information for safe completion of IADLs.     Progress towards goals: progressing    Barriers to Achieving Goals:  Left neglect    Changes to Plan:  Continue plan of care      Time: 10:58-10:30 = 30minutes         Reta Scruggs MS, CCC-SLP

## 2021-12-13 ENCOUNTER — TREATMENT (OUTPATIENT)
Dept: PHYSICAL THERAPY | Facility: CLINIC | Age: 71
End: 2021-12-13

## 2021-12-13 DIAGNOSIS — R41.841 COGNITIVE COMMUNICATION DEFICIT: Primary | ICD-10-CM

## 2021-12-13 PROCEDURE — 97129 THER IVNTJ 1ST 15 MIN: CPT | Performed by: SPEECH-LANGUAGE PATHOLOGIST

## 2021-12-13 PROCEDURE — 97130 THER IVNTJ EA ADDL 15 MIN: CPT | Performed by: SPEECH-LANGUAGE PATHOLOGIST

## 2021-12-13 NOTE — PROGRESS NOTES
Outpatient Speech Language Pathology   Adult Speech Language Cognitive Treatment Note       Patient Name: Kimber Carranza  : 1950  MRN: [id number]  Today's Date: 2021         Visit Date: 2021         Today Visit number: 8  Medical Diagnosis:CVA  Treatment Diagnosis:Cognitive  Communication Deficits      SUBJECTIVE  Patient/Therapist Comments: Reviewed plan of care and the need to increase visual attention.Patient indicated she is now cooking but indicated she placed too much cinnamon in her cookie recipe secondary to decreased visual attention to teaspoon versus tablespoon.     Education: Plan of care          Patient is here today to increase memory recall and attention for safe completion of IADLs.           SPEECH THERAPY    GOALS ACTIVITY PERFORMED TRIALS COMPLETED LEVEL OF CUEING REQUIRED   STG 1a: 8 weeks: Patient will demonstrate the ability to use appropriate memory strategies to encode novel and complex information with min cueing.   STATUS:  New           f memory strategies five strategies: write it down, repetition, association, grouping and visualization Min assist to recall strategies   STG 1b: 8 weeks:  Patient will complete mod complex short term memory tasks with 80% accuracy and/or min assist for strategy use.    STATUS: New            STG 1c: 8 weeks: Patient will complete immediate memory tasks with 80% accuracy and min assist for strategy use.    STATUS: New               Short term recall              Immediate recall   Delayed recall of 6 unassociated words:   15 minutes:  30 minutes: 6  40 minutes:         Immediate recall of unassociated words:            No assist                   no assist for strategy use     STG 2a: 8 weeks.  Patient will complete visual attention tasks with min assist to attend to multiple information required to make appropriate and timely decisions for ADLS.   STATUS: New       Visual attention Sequence- visual scanning to make a  run of five either diagonally, laterally or horizontally.    Patient required one cueing at beginning of session to remember to pick a card prior to next play.    -good visual scanning and thought organization.  Patient able to make 4 blocks secondary to good visual scanning and attention Min  assist to attend to  visual stimuli.                               ASSESSMENT/PLAN  Need for skilled care:  Patient requires the skills of a therapist to provide reduced cueing to min assist to use memory strategies and for visual attention to encode information for safe completion of IADLs.     Progress towards goals: progressing    Barriers to Achieving Goals:  Left neglect    Changes to Plan:  Continue plan of care      Time: 11:48-12:37 = 49 minutes         Reta Scruggs MS, CCC-SLP

## 2021-12-17 ENCOUNTER — TREATMENT (OUTPATIENT)
Dept: PHYSICAL THERAPY | Facility: CLINIC | Age: 71
End: 2021-12-17

## 2021-12-17 DIAGNOSIS — R41.841 COGNITIVE COMMUNICATION DEFICIT: Primary | ICD-10-CM

## 2021-12-17 PROCEDURE — 97130 THER IVNTJ EA ADDL 15 MIN: CPT | Performed by: SPEECH-LANGUAGE PATHOLOGIST

## 2021-12-17 PROCEDURE — 97129 THER IVNTJ 1ST 15 MIN: CPT | Performed by: SPEECH-LANGUAGE PATHOLOGIST

## 2021-12-17 NOTE — PROGRESS NOTES
Outpatient Speech Language Pathology   Adult Speech Language Cognitive Treatment Note       Patient Name: Kimber Carranza  : 1950  MRN: [id number]  Today's Date: 2021         Visit Date: 2021         Today Visit number: 9  Medical Diagnosis:CVA  Treatment Diagnosis:Cognitive  Communication Deficits      SUBJECTIVE  Patient/Therapist Comments: Reviewed plan of care and the need to increase visual attention along with memory.    Education: Plan of care and activities to complete at home to increase memory and visual attention.         Patient is here today to increase memory recall and attention for safe completion of IADLs.           SPEECH THERAPY    GOALS ACTIVITY PERFORMED TRIALS COMPLETED LEVEL OF CUEING REQUIRED   STG 1a: 8 weeks: Patient will demonstrate the ability to use appropriate memory strategies to encode novel and complex information with min cueing.              memory strategies five strategies: write it down, repetition, association, grouping and visualization Min assist to recall strategies   STG 1b: 8 weeks:  Patient will complete mod complex short term memory tasks with 80% accuracy and/or min assist for strategy use.                STG 1c: 8 weeks: Patient will complete immediate memory tasks with 80% accuracy and min assist for strategy use.                 Short term recall              Immediate recall   Delayed recall: 5/5            Working memory- Sodoku step while working the puzzle.   Min to Moderate assist for recall of previous learned strategies.  Patient given an additional handout of the strategies to encode into memory for recall.       Moderate assist to cue for repetition of the direction.                STG 2a: 8 weeks.  Patient will complete visual attention tasks with min assist to attend to multiple information required to make appropriate and timely decisions for ADLS.  w       Visual attention Sodoku- scanning vertically, horizontally and within the  This is a follow up after hospitalized, after testing positive for covid 19 an c/o sob chills and fever     Health Maintenance Due   Topic Date Due   • Shingles Vaccine (1 of 2) 01/07/2007     MD to address HM topics with patient       Denies known Latex allergy or symptoms of Latex sensitivity.  Medications reviewed and updated.  Tobacco reviewed        Patient would like communication of their results via:        Cell Phone:   Telephone Information:   Mobile [phone]     Okay to leave a message containing results? Yes     blocks while holding information in memory.       Moderate assist to redirect her visual attention                             ASSESSMENT/PLAN  Need for skilled care:  Patient requires the skills of a therapist to provide min to mod assist to use memory strategies and for visual attention for moderately complex tasks to encode information for safe completion of IADLs.     Progress towards goals: progressing nicely with increase of visual and recall complexity increased this week.      Barriers to Achieving Goals:  Left neglect    Changes to Plan:  Continue plan of care      Time: 11:02-12:07 = 65 minutes         Reta Scruggs MS, CCC-SLP

## 2021-12-20 ENCOUNTER — TREATMENT (OUTPATIENT)
Dept: PHYSICAL THERAPY | Facility: CLINIC | Age: 71
End: 2021-12-20

## 2021-12-20 DIAGNOSIS — R41.841 COGNITIVE COMMUNICATION DEFICIT: Primary | ICD-10-CM

## 2021-12-20 PROCEDURE — 97130 THER IVNTJ EA ADDL 15 MIN: CPT | Performed by: SPEECH-LANGUAGE PATHOLOGIST

## 2021-12-20 PROCEDURE — 97129 THER IVNTJ 1ST 15 MIN: CPT | Performed by: SPEECH-LANGUAGE PATHOLOGIST

## 2021-12-22 ENCOUNTER — TREATMENT (OUTPATIENT)
Dept: PHYSICAL THERAPY | Facility: CLINIC | Age: 71
End: 2021-12-22

## 2021-12-22 DIAGNOSIS — I63.9 CEREBROVASCULAR ACCIDENT (CVA), UNSPECIFIED MECHANISM (HCC): ICD-10-CM

## 2021-12-22 DIAGNOSIS — R41.841 COGNITIVE COMMUNICATION DEFICIT: Primary | ICD-10-CM

## 2021-12-22 PROCEDURE — 97530 THERAPEUTIC ACTIVITIES: CPT | Performed by: PHYSICAL THERAPIST

## 2021-12-22 NOTE — PROGRESS NOTES
Progress Assessment / Discharge        Patient: Kimber Carranza   : 1950  Diagnosis/ICD-10 Code:  Cognitive communication deficit [R41.841]  Referring practitioner: Scooby Calderon MD  Date of Initial Visit: Type: THERAPY  Noted: 2021  Today's Date: 2021  Patient seen for 4 sessions      Subjective:     Subjective Questionnaire: DGI:   Clinical Progress: improved  Home Program Compliance: Yes  Treatment has included: therapeutic exercise, neuromuscular re-education and therapeutic activity    Subjective Evaluation    History of Present Illness  Mechanism of injury: Pt reporting she has been discharged from speech therapy at this time. She feels she is able to do everything she needs to be able to do at home. She just takes things slowly, uses the handrail for the stairs, which is no different than what she did before the stroke. She is wanting to be done with formal therapy and perform her HEP independently.    Pain  Current pain ratin         Objective   Strength/Myotome Testing     Left Shoulder      Planes of Motion   Flexion: 4   Abduction: 4+   External rotation at 0°: 4+     Right Shoulder      Planes of Motion   Flexion: 4+   Abduction: 4+   External rotation at 0°: 4+      Left Hip   Planes of Motion   Flexion: 4+  Extension: 4  Abduction: 4  Adduction: 4+     Right Hip   Planes of Motion   Flexion: 4+  Extension: 4  Abduction: 4+  Adduction: 4+     Left Knee   Flexion: 4+  Extension: 4+     Right Knee   Flexion: 4+  Extension: 5     Left Ankle/Foot   Dorsiflexion: 4  Plantar flexion: 4     Right Ankle/Foot   Dorsiflexion: 4  Plantar flexion: 4+      Ambulation      Comments   Decreased trunk dissociation with decreased left arm swing during ambulation     Functional Assessment      Comments  Dynamic Gait Index Score Sheet  (Adapted from [employer]: Theory and Practical Applications, 1995)        1. Gait level surface __3___  Instructions: Walk at  your normal speed from here to the next mirta (20’)     Grading: Mirta the lowest category that applies.  (3) Normal: Walks 20’, no assistive devices, good speed, no evidence for imbalance, normal gait pattern.  (2) Mild Impairment: Walks 20’, uses assistive devices, slower speed, mild gait deviations.  (1) Moderate Impairment: Walks 20’, slow speed, abnormal gait pattern, evidence for imbalance.  (0) Severe Impairment: Cannot walk 20’ without assistance, severe gait deviations or imbalance.        2. Change in gait speed __2___  Instructions: Begin walking at your normal pace (for 5’), when I tell you “go,” walk as fast as you can (for 5’). When I tell you “slow,” walk as slowly as  you can (for 5’).     Grading: Mirta the lowest category that applies.  (3) Normal: Able to smoothly change walking speed without loss of balance or gait deviation. Shows a significant difference in walking speeds  between normal, fast and slow speeds.  (2) Mild Impairment: Is able to change speed but demonstrates mild gait deviations, or no gait deviations but is unable to achieve a significant  change in velocity, or uses an assistive device.  (1) Moderate Impairment: Makes only minor adjustments to walking speed, or accomplishes a change in speed with significant gait deviations, or   changes speed but loses significant gait deviations, or changes speed but loses balance but is able to recover and continue walking.  (0) Severe Impairment: Cannot change speeds, or loses balance and has to reach for wall or be caught.           3. Gait with horizontal head turns __3___  Instructions: Begin walking at your normal pace. When I tell you to “look right,” keep walking straight, but turn your head to the right. Keep looking to  the right until I tell you, “look left,” then keep walking straight and turn your head to the left. Keep your head to the left until I tell you “look straight,“   then keep walking straight, but return your head to the  center.     Grading: Aravind the lowest category that applies.  (3) Normal: Performs head turns smoothly with no change in gait.  (2) Mild Impairment: Performs head turns smoothly with slight change in gait velocity, i.e., minor disruption to smooth gait path or uses walking aid.  (1) Moderate Impairment: Performs head turns with moderate change in gait velocity, slows down, staggers but recovers, can continue to walk.  (0) Severe Impairment: Performs task with severe disruption of gait, i.e., staggers, outside 15” path, loses balance, stops, reaches for wall.           4. Gait with vertical head turns __3___  Instructions: Begin walking at your normal pace. When I tell you to “look up,” keep walking straight, but tip your head up. Keep looking up until I tell  you, “look down,” then keep walking straight and tip your head down. Keep your head down until I tell you “look straight,“ then keep walking straight,  but return your head to the center.     Grading: Aravind the lowest category that applies.  (3) Normal: Performs head turns smoothly with no change in gait.  (2) Mild Impairment: Performs task with slight change in gait velocity, i.e., minor disruption to smooth gait path or uses walking aid.  (1) Moderate Impairment: Performs task with moderate change in gait velocity, slows down, staggers but recovers, can continue to walk.  (0) Severe Impairment: Performs task with severe disruption of gait, i.e., staggers, outside 15” path, loses balance, stops, reaches for wall.           5. Gait and pivot turn __3___  Instructions: Begin walking at your normal pace. When I tell you, “turn and stop,” turn as quickly as you can to face the opposite direction and stop.     Grading: Aravind the lowest category that applies.  (3) Normal: Pivot turns safely within 3 seconds and stops quickly with no loss of balance.  (2) Mild Impairment: Pivot turns safely in > 3 seconds and stops with no loss of balance.  (1) Moderate Impairment:  Turns slowly, requires verbal cueing, requires several small steps to catch balance following turn & stop.  (0) Severe Impairment: Cannot turn safely, requires assistance to turn and stop.         6. Step over obstacle __3__  Instructions: Begin walking at your normal speed. When you come to the shoebox, step over it, not around it, and keep walking.     Grading: Aravind the lowest category that applies.  (3) Normal: Is able to step over the box without changing gait speed, no evidence of imbalance.  (2) Mild Impairment: Is able to step over box, but must slow down and adjust steps to clear box safely.  (1) Moderate Impairment: Is able to step over box but must stop, then step over. May require verbal cueing.  (0) Severe Impairment: Cannot perform without assistance.           7. Step around obstacles __3___  Instructions: Begin walking at normal speed. When you come to the first cone (about 6’ away), walk around the right side of it. When you come to the  second cone (6’ past first cone), walk around it to the left.     Grading: Aravind the lowest category that applies.  (3) Normal: Is able to walk around cones safely without changing gait speed; no evidence of imbalance.  (2) Mild Impairment: Is able to step around both cones, but must slow down and adjust steps to clear cones.  (1) Moderate Impairment: Is able to clear cones but must significantly slow speed to accomplish task, or requires verbal cueing.  (0) Severe Impairment: Unable to clear cones, walks into one or both cones, or requires physical assistance.           8. Steps __2___  Instructions: Walk up these stairs as you would at home, i.e., using the railing if necessary. At the top, turn around and walk down.     Grading: Aravind the lowest category that applies.  (3) Normal: Alternating feet, no rail.  (2) Mild Impairment: Alternating feet, must use rail.  (1) Moderate Impairment: Two feet to a stair, must use rail.  (0) Severe Impairment: Cannot do  safely.           TOTAL SCORE: __22_ / 24     Scoring Information: 21/24 or above = minimal to no risk for falls  Below 21 indicates risk for falls and the lower the score the more the risk          Assessment & Plan     Assessment  Impairments: abnormal coordination, abnormal gait, activity intolerance, impaired balance, impaired physical strength and lacks appropriate home exercise program  Functional Limitations: carrying objects, lifting, walking and standing  Assessment details: The patient presents to physical therapy with complaints of decreased strength and balance post CVA. The patient presents with associated left lower extremity weakness, balance deficits, and functional deficits (DGI). Pt has met most of her short term goals set at the evaluation. She demonstrates good understanding on ongoing need to perform HEP and to continue to exercise. She is wanting to be done with therapy at this time, she will be discharged.    Goals  Plan Goals: Impaired Gross Mobility     Upper Extremity Weakness     1. The patient has limited strength of the left shoulder.   LTG 1: 12 weeks:  The patient will demonstrate 5 /5 strength for left shoulder flexion, abduction, external rotation, and internal rotation in order to demonstrate improved shoulder stability.   STATUS:  Not met  STG 1a: 4 weeks:  The patient will demonstrate 4+/5 strength for left shoulder flexion, abduction, external rotation, and internal rotation.   STATUS:  Not met  STG 1b:  4 weeks:  The patient will be compliant with HEP (with or without assistant from caregiver) at least 3 days per week in order to improve strength and function of upper extremity.  STATUS:  MET  TREATMENT: Manual therapy, therapeutic exercise, home exercise instruction, and modalities as needed to include: electrical stimulation, ultrasound, moist heat, and ice.     2. The patient demonstrates weakness of the left lower extremity    LTG 2: 12 weeks:  The patient will demonstrate  overall left hip strength of 4+/5 in order to improve hip stability and promote improved balance during gait.   STATUS:  Not met, progressing  STG 2a: 6 weeks:  The patient will demonstrate 4/5 strength for left hip flexion, abduction,   and extension.   STATUS: MET   STG 2b: 6 weeks:  The patient will be compliant with HEP (with or without assistant from caregiver) at least 3 days per week in order to improve strength and function.   STATUS:  MET  TREATMENT: Therapeutic exercises, manual therapy, aquatic therapy, home exercise instruction, and modalities as needed for pain to include:  electrical stimulation.    4. The patient has decreased ability to perform functional ambulation maintaining balance.  LTG 4: 12 weeks:  The patient will ambulate with demonstrate >22/24 on DGI.  STATUS: Not met, progressing  STG 4a: 6 weeks: The patient will ambulate with demonstrate >20/24 on DGI.  STATUS: MET  TREATMENT: Gait training, therapeutic activities, therapeutic exercise, neuromuscular re-education            Plan  Therapy options: will not be seen for skilled therapy services  Planned therapy interventions: stretching  Treatment plan discussed with: patient      Progress toward previous goals: Partially Met    See Exercise, Manual, and Modality Logs for complete treatment.         Recommendations: Discharge      PT Signature: Silvestre Jean PT    Electronically signed 2021    [place] License: PT - [id number]     Based upon review of the patient's progress and continued therapy plan, it is my medical opinion that Kimber Carranza should continue physical therapy treatment at [facility]  [address]  [place] [address] [phone].      Timed:         Manual Therapy:    0     mins  68120;     Therapeutic Exercise:    0     mins  [id number];     Neuromuscular Emmy:    0    mins  [id number];    Therapeutic Activity:     15     mins  [id number];     Gait Trainin     mins  [id number];      Ultrasound:     0     mins  16662;    Ionto                               0    mins   [id number]  Self Care                       0     mins   [id number]  Aquatic                          0     mins [id number]          Timed Treatment:   15   mins   Total Treatment:     15   mins      I certify that the therapy services are furnished while this patient is under my care.  The services outlined above are required by this patient, and will be reviewed every 90 days.

## 2022-03-04 ENCOUNTER — OFFICE VISIT (OUTPATIENT)
Dept: CARDIOLOGY | Facility: CLINIC | Age: 72
End: 2022-03-04

## 2022-03-04 VITALS
SYSTOLIC BLOOD PRESSURE: 126 MMHG | HEART RATE: 72 BPM | DIASTOLIC BLOOD PRESSURE: 68 MMHG | BODY MASS INDEX: 31.54 KG/M2 | WEIGHT: 178 LBS | HEIGHT: 63 IN

## 2022-03-04 DIAGNOSIS — I10 HYPERTENSION, ESSENTIAL: ICD-10-CM

## 2022-03-04 DIAGNOSIS — R00.2 PALPITATIONS: Primary | ICD-10-CM

## 2022-03-04 PROCEDURE — 99214 OFFICE O/P EST MOD 30 MIN: CPT | Performed by: SPECIALIST

## 2022-03-04 RX ORDER — ACETAMINOPHEN 325 MG/1
650 TABLET ORAL DAILY PRN
COMMUNITY
Start: 2021-10-28

## 2022-03-04 NOTE — PROGRESS NOTES
[facility]  Cardiology progress Note    Patient Name: Kimber Carranaz  : 1950    CHIEF COMPLAINT  Palpitations.      Subjective   Subjective     HISTORY OF PRESENT ILLNESS    Kimber Carranza is a 71 y.o. female with history of palpitations.  No chest pain or shortness of breath.  Had intracerebral and subarachnoid bleed in October has since recovered.    Review of Systems:   Constitutional no fever,  no weight loss   Skin no rash   Otolaryngeal no difficulty swallowing   Cardiovascular See HPI   Pulmonary no cough, no sputum production   Gastrointestinal no constipation, no diarrhea   Genitourinary no dysuria, no hematuria   Hematologic no easy bruisability, no abnormal bleeding   Musculoskeletal no muscle pain   Neurologic no dizziness, no falls         Personal History     Social History:  reports that she has never smoked. She has never used smokeless tobacco. She reports that she does not drink alcohol and does not use drugs.    Home Medications:  Current Outpatient Medications on File Prior to Visit   Medication Sig   • acetaminophen (TYLENOL) 325 MG tablet Take 650 mg by mouth Daily As Needed.   • amLODIPine (NORVASC) 5 MG tablet Take 1 tablet by mouth Daily. (Patient taking differently: Take 10 mg by mouth Daily.)   • cholecalciferol (VITAMIN D3) 25 MCG (1000 UT) tablet Take 1,000 Units by mouth Daily.   • glucose blood test strip 1 strip by Other route Daily.   • lisinopril (PRINIVIL,ZESTRIL) 40 MG tablet Take 1 tablet by mouth Daily for 30 days.   • metFORMIN (GLUCOPHAGE) 1000 MG tablet Take 1,000 mg by mouth 2 (two) times a day.   • metoprolol succinate XL (TOPROL-XL) 50 MG 24 hr tablet Take 1 tablet by mouth Every 12 (Twelve) Hours for 30 days.   • Omega-3 Fatty Acids (fish oil) 1000 MG capsule capsule Take 1,200 mg by mouth Daily With Breakfast.   • pantoprazole (PROTONIX) 40 MG EC tablet Take 1 tablet by mouth Every Morning.     No current facility-administered medications on  file prior to visit.     Allergies:  Allergies   Allergen Reactions   • Statins GI Intolerance       Objective    Objective       Vitals:   Heart Rate:  [72] 72  BP: (126)/(68) 126/68  Body mass index is 31.53 kg/m².     Physical Exam:   Constitutional: Awake, alert, No acute distress    Eyes: PERRLA, sclerae anicteric, no conjunctival injection   HENT: NCAT, mucous membranes moist   Neck: Supple, no thyromegaly, no lymphadenopathy, trachea midline   Respiratory: Clear to auscultation bilaterally, nonlabored respirations    Cardiovascular: RRR, no murmurs or rubs. Palpable pedal pulses bilaterally   Musculoskeletal: No bilateral ankle edema, no cyanosis to extremities   Psychiatric: Appropriate affect, cooperative   Neurologic: Oriented x 3, strength symmetric in all extremities, Cranial Nerves grossly intact to confrontation, speech clear   Skin: No rashes.    Result Review    Result Review:  I have personally reviewed the available results from  [x]  Laboratory  [x]  EKG  [x]  Cardiology  [x]  Medications  [x]  Old records  []  Other:   Procedures  Lab Results   Component Value Date    CHOL 162 10/19/2021     Lab Results   Component Value Date    TRIG 202 (H) 10/19/2021    TRIG 229 (H) 06/08/2020     Lab Results   Component Value Date    HDL 38 (L) 10/19/2021    HDL 44 (L) 06/08/2020     Lab Results   Component Value Date    LDL 90 10/19/2021     (H) 06/08/2020     Lab Results   Component Value Date    VLDL 34 10/19/2021    VLDL 46 (H) 06/08/2020     Results for orders placed during the hospital encounter of 10/18/21    Adult Transthoracic Echo Complete W/ Cont if Necessary Per Protocol (With Agitated Saline)    Interpretation Summary  · Calculated left ventricular EF = 70% Estimated left ventricular EF was in agreement with the calculated left ventricular EF.  · Left ventricular diastolic function is consistent with (grade I) impaired relaxation.  · No hemodynamically significant valvular pathology.      Impression/Plan:  1.  Stable palpitations: Continue Toprol-XL 50 mg once a day.  2.  Essential hypertension controlled: Continue Zestril 40 mg a day.  Continue amlodipine 5 mg a day.  Blood pressure controlled at home.  Recent echocardiogram shows normal left ventricular systolic function.  3.  Recent intracerebral bleed: Improved.  4.  Pedal edema: Hydrochlorothiazide 25 mg as needed    Clifford Donald MD   03/04/22   10:51 EST

## 2022-03-29 RX ORDER — METOPROLOL SUCCINATE 50 MG/1
50 TABLET, EXTENDED RELEASE ORAL EVERY 12 HOURS SCHEDULED
Qty: 180 TABLET | Refills: 3 | Status: SHIPPED | OUTPATIENT
Start: 2022-03-29 | End: 2023-03-20

## 2022-07-28 ENCOUNTER — TRANSCRIBE ORDERS (OUTPATIENT)
Dept: ADMINISTRATIVE | Facility: HOSPITAL | Age: 72
End: 2022-07-28

## 2022-07-28 DIAGNOSIS — Z12.31 VISIT FOR SCREENING MAMMOGRAM: Primary | ICD-10-CM

## 2022-09-25 NOTE — PROGRESS NOTES
[facility]  Cardiology progress Note    Patient Name: Kimber Carranza  : 1950    CHIEF COMPLAINT  Palpitations, hypertension        Subjective   Subjective     HISTORY OF PRESENT ILLNESS    Kimber Carranza is a 72 y.o. female with history of palpitations and hypertension.  No chest pain or shortness of breath.    REVIEW OF SYSTEMS    Constitutional:    No fever, no weight loss  Skin:     No rash  Otolaryngeal:    No difficulty swallowing  Cardiovascular:  No chest pain or shortness of breath  Pulmonary:    No cough, no sputum production    Personal History     Social History:    reports that she has never smoked. She has never used smokeless tobacco. She reports that she does not drink alcohol and does not use drugs.    Home Medications:  Current Outpatient Medications on File Prior to Visit   Medication Sig   • acetaminophen (TYLENOL) 325 MG tablet Take 650 mg by mouth Daily As Needed.   • amLODIPine (NORVASC) 10 MG tablet Take 10 mg by mouth Daily.   • cholecalciferol (VITAMIN D3) 25 MCG (1000 UT) tablet Take 1,000 Units by mouth Daily.   • glucose blood test strip 1 strip by Other route Daily.   • hydroCHLOROthiazide (MICROZIDE) 12.5 MG capsule Take 12.5 mg by mouth Daily.   • lisinopril (PRINIVIL,ZESTRIL) 40 MG tablet Take 1 tablet by mouth Daily for 30 days.   • metFORMIN (GLUCOPHAGE) 1000 MG tablet Take 1,000 mg by mouth 2 (two) times a day.   • metoprolol succinate XL (TOPROL-XL) 50 MG 24 hr tablet Take 1 tablet by mouth Every 12 (Twelve) Hours.   • Omega-3 Fatty Acids (fish oil) 1000 MG capsule capsule Take 1,200 mg by mouth Daily With Breakfast.   • pantoprazole (PROTONIX) 40 MG EC tablet Take 1 tablet by mouth Every Morning.   • amLODIPine (NORVASC) 5 MG tablet Take 1 tablet by mouth Daily. (Patient taking differently: Take 10 mg by mouth Daily.)     No current facility-administered medications on file prior to visit.       Past Medical History:   Diagnosis Date   • Diabetes  mellitus (HCC)    • Elevated cholesterol    • Essential hypertension 7/28/2021   • Palpitations 7/28/2021   • Stroke (HCC)        Allergies:  Allergies   Allergen Reactions   • Statins GI Intolerance       Objective    Objective       Vitals:   Heart Rate:  [60] 60  BP: (144-146)/(62-64) 144/62  Body mass index is 31.53 kg/m².     PHYSICAL EXAM:    General Appearance:   · well developed  · well nourished  HENT:   · oropharynx moist  · lips not cyanotic  Neck:  · thyroid not enlarged  · supple  Respiratory:  · no respiratory distress  · normal breath sounds  · no rales  Cardiovascular:  · no jugular venous distention  · regular rhythm  · apical impulse normal  · S1 normal, S2 normal  · no S3, no S4   · no murmur  · no rub, no thrill  · carotid pulses normal; no bruit  · pedal pulses normal  · lower extremity edema: none    Skin:   · warm, dry  Psychiatric:  · judgement and insight appropriate  · normal mood and affect        Result Review:  I have personally reviewed the available results from  [x]  Laboratory  [x]  EKG  [x]  Cardiology  [x]  Medications  [x]  Old records  []  Other:     Procedures  Lab Results   Component Value Date    CHOL 162 10/19/2021     Lab Results   Component Value Date    TRIG 202 (H) 10/19/2021    TRIG 229 (H) 06/08/2020     Lab Results   Component Value Date    HDL 38 (L) 10/19/2021    HDL 44 (L) 06/08/2020     Lab Results   Component Value Date    LDL 90 10/19/2021     (H) 06/08/2020     Lab Results   Component Value Date    VLDL 34 10/19/2021    VLDL 46 (H) 06/08/2020     Results for orders placed during the hospital encounter of 10/18/21    Adult Transthoracic Echo Complete W/ Cont if Necessary Per Protocol (With Agitated Saline)    Interpretation Summary  · Calculated left ventricular EF = 70% Estimated left ventricular EF was in agreement with the calculated left ventricular EF.  · Left ventricular diastolic function is consistent with (grade I) impaired relaxation.  · No  hemodynamically significant valvular pathology.     Impression/Plan:  1.  Stable palpitations: Continue Toprol-XL 50 mg twice a day.  No palpitations.  2.  Essential hypertension Uncontrolled: Continue amlodipine 10 mg a day.  Continue Zestril 40 mg a day.  Increase hydrochlorothiazide to 25 mg a day.  Monitor blood pressure regularly.  Low-salt diet advised.  3.  Morbid obesity: Low-fat diet and exercise advised to           Clifford Donald MD   09/27/22   09:04 EDT

## 2022-09-27 ENCOUNTER — OFFICE VISIT (OUTPATIENT)
Dept: CARDIOLOGY | Facility: CLINIC | Age: 72
End: 2022-09-27

## 2022-09-27 VITALS
DIASTOLIC BLOOD PRESSURE: 62 MMHG | HEART RATE: 60 BPM | SYSTOLIC BLOOD PRESSURE: 144 MMHG | HEIGHT: 63 IN | WEIGHT: 178 LBS | BODY MASS INDEX: 31.54 KG/M2

## 2022-09-27 DIAGNOSIS — I10 HYPERTENSION, ESSENTIAL: Primary | ICD-10-CM

## 2022-09-27 PROCEDURE — 99214 OFFICE O/P EST MOD 30 MIN: CPT | Performed by: SPECIALIST

## 2022-09-27 RX ORDER — HYDROCHLOROTHIAZIDE 12.5 MG/1
12.5 CAPSULE, GELATIN COATED ORAL DAILY
COMMUNITY
Start: 2022-07-18 | End: 2022-09-27 | Stop reason: SDUPTHER

## 2022-09-27 RX ORDER — AMLODIPINE BESYLATE 10 MG/1
10 TABLET ORAL DAILY
COMMUNITY
Start: 2022-07-18

## 2022-09-27 RX ORDER — HYDROCHLOROTHIAZIDE 12.5 MG/1
25 CAPSULE, GELATIN COATED ORAL DAILY
Qty: 90 CAPSULE | Refills: 6 | Status: SHIPPED | OUTPATIENT
Start: 2022-09-27

## 2022-09-27 NOTE — PATIENT INSTRUCTIONS
"Low-Sodium Eating Plan  Sodium, which is an element that makes up salt, helps you maintain a healthy balance of fluids in your body. Too much sodium can increase your blood pressure and cause fluid and waste to be held in your body.  Your health care provider or dietitian may recommend following this plan if you have high blood pressure (hypertension), kidney disease, liver disease, or heart failure. Eating less sodium can help lower your blood pressure, reduce swelling, and protect your heart, liver, and kidneys.  What are tips for following this plan?  Reading food labels  The Nutrition Facts label lists the amount of sodium in one serving of the food. If you eat more than one serving, you must multiply the listed amount of sodium by the number of servings.  Choose foods with less than 140 mg of sodium per serving.  Avoid foods with 300 mg of sodium or more per serving.  Shopping    Look for lower-sodium products, often labeled as \"low-sodium\" or \"no salt added.\"  Always check the sodium content, even if foods are labeled as \"unsalted\" or \"no salt added.\"  Buy fresh foods.  Avoid canned foods and pre-made or frozen meals.  Avoid canned, cured, or processed meats.  Buy breads that have less than 80 mg of sodium per slice.  Cooking    Eat more home-cooked food and less restaurant, buffet, and fast food.  Avoid adding salt when cooking. Use salt-free seasonings or herbs instead of table salt or sea salt. Check with your health care provider or pharmacist before using salt substitutes.  Cook with plant-based oils, such as canola, sunflower, or olive oil.  Meal planning  When eating at a restaurant, ask that your food be prepared with less salt or no salt, if possible. Avoid dishes labeled as brined, pickled, cured, smoked, or made with soy sauce, miso, or teriyaki sauce.  Avoid foods that contain MSG (monosodium glutamate). MSG is sometimes added to Chinese food, bouillon, and some canned foods.  Make meals that can " "be grilled, baked, poached, roasted, or steamed. These are generally made with less sodium.  General information  Most people on this plan should limit their sodium intake to 1,500-2,000 mg (milligrams) of sodium each day.  What foods should I eat?  Fruits  Fresh, frozen, or canned fruit. Fruit juice.  Vegetables  Fresh or frozen vegetables. \"No salt added\" canned vegetables. \"No salt added\" tomato sauce and paste. Low-sodium or reduced-sodium tomato and vegetable juice.  Grains  Low-sodium cereals, including oats, puffed wheat and rice, and shredded wheat. Low-sodium crackers. Unsalted rice. Unsalted pasta. Low-sodium bread. Whole-grain breads and whole-grain pasta.  Meats and other proteins  Fresh or frozen (no salt added) meat, poultry, seafood, and fish. Low-sodium canned tuna and salmon. Unsalted nuts. Dried peas, beans, and lentils without added salt. Unsalted canned beans. Eggs. Unsalted nut butters.  Dairy  Milk. Soy milk. Cheese that is naturally low in sodium, such as ricotta cheese, fresh mozzarella, or Swiss cheese. Low-sodium or reduced-sodium cheese. Cream cheese. Yogurt.  Seasonings and condiments  Fresh and dried herbs and spices. Salt-free seasonings. Low-sodium mustard and ketchup. Sodium-free salad dressing. Sodium-free light mayonnaise. Fresh or refrigerated horseradish. Lemon juice. Vinegar.  Other foods  Homemade, reduced-sodium, or low-sodium soups. Unsalted popcorn and pretzels. Low-salt or salt-free chips.  The items listed above may not be a complete list of foods and beverages you can eat. Contact a dietitian for more information.  What foods should I avoid?  Vegetables  Sauerkraut, pickled vegetables, and relishes. Olives. French fries. Onion rings. Regular canned vegetables (not low-sodium or reduced-sodium). Regular canned tomato sauce and paste (not low-sodium or reduced-sodium). Regular tomato and vegetable juice (not low-sodium or reduced-sodium). Frozen vegetables in " sauces.  Grains  Instant hot cereals. Bread stuffing, pancake, and biscuit mixes. Croutons. Seasoned rice or pasta mixes. Noodle soup cups. Boxed or frozen macaroni and cheese. Regular salted crackers. Self-rising flour.  Meats and other proteins  Meat or fish that is salted, canned, smoked, spiced, or pickled. Precooked or cured meat, such as sausages or meat loaves. Dias. Ham. Pepperoni. Hot dogs. Corned beef. Chipped beef. Salt pork. Jerky. Pickled herring. Anchovies and sardines. Regular canned tuna. Salted nuts.  Dairy  Processed cheese and cheese spreads. Hard cheeses. Cheese curds. Blue cheese. Feta cheese. String cheese. Regular cottage cheese. Buttermilk. Canned milk.  Fats and oils  Salted butter. Regular margarine. Ghee. Dias fat.  Seasonings and condiments  Onion salt, garlic salt, seasoned salt, table salt, and sea salt. Canned and packaged gravies. Worcestershire sauce. Tartar sauce. Barbecue sauce. Teriyaki sauce. Soy sauce, including reduced-sodium. Steak sauce. Fish sauce. Oyster sauce. Cocktail sauce. Horseradish that you find on the shelf. Regular ketchup and mustard. Meat flavorings and tenderizers. Bouillon cubes. Hot sauce. Pre-made or packaged marinades. Pre-made or packaged taco seasonings. Relishes. Regular salad dressings. Salsa.  Other foods  Salted popcorn and pretzels. Corn chips and puffs. Potato and tortilla chips. Canned or dried soups. Pizza. Frozen entrees and pot pies.  The items listed above may not be a complete list of foods and beverages you should avoid. Contact a dietitian for more information.  Summary  Eating less sodium can help lower your blood pressure, reduce swelling, and protect your heart, liver, and kidneys.  Most people on this plan should limit their sodium intake to 1,500-2,000 mg (milligrams) of sodium each day.  Canned, boxed, and frozen foods are high in sodium. Restaurant foods, fast foods, and pizza are also very high in sodium. You also get sodium by  adding salt to food.  Try to cook at home, eat more fresh fruits and vegetables, and eat less fast food and canned, processed, or prepared foods.  This information is not intended to replace advice given to you by your health care provider. Make sure you discuss any questions you have with your health care provider.  Document Revised: 01/22/2021 Document Reviewed: 11/18/2020  Elsevier Patient Education © 2022 [employer]

## 2022-09-30 ENCOUNTER — APPOINTMENT (OUTPATIENT)
Dept: MAMMOGRAPHY | Facility: HOSPITAL | Age: 72
End: 2022-09-30

## 2022-11-22 ENCOUNTER — HOSPITAL ENCOUNTER (OUTPATIENT)
Dept: MAMMOGRAPHY | Facility: HOSPITAL | Age: 72
Discharge: HOME | End: 2022-11-22
Admitting: NURSE PRACTITIONER

## 2022-11-22 DIAGNOSIS — Z12.31 VISIT FOR SCREENING MAMMOGRAM: ICD-10-CM

## 2022-11-22 PROCEDURE — 77063 BREAST TOMOSYNTHESIS BI: CPT

## 2022-11-22 PROCEDURE — 77067 SCR MAMMO BI INCL CAD: CPT

## 2023-03-20 RX ORDER — METOPROLOL SUCCINATE 50 MG/1
TABLET, EXTENDED RELEASE ORAL
Qty: 180 TABLET | Refills: 3 | Status: SHIPPED | OUTPATIENT
Start: 2023-03-20

## 2023-04-21 NOTE — PROGRESS NOTES
[facility]  Cardiology progress Note    Patient Name: Kimber Carranza  : 1950    CHIEF COMPLAINT  Palpitations        Subjective   Subjective     HISTORY OF PRESENT ILLNESS    Kimber Carranza is a 73 y.o. female with history of palpitations hypertension.  No further palpitations    REVIEW OF SYSTEMS    Constitutional:    No fever, no weight loss  Skin:     No rash  Otolaryngeal:    No difficulty swallowing  Cardiovascular: See HPI.  Pulmonary:    No cough, no sputum production    Personal History     Social History:    reports that she has never smoked. She has never used smokeless tobacco. She reports that she does not drink alcohol and does not use drugs.    Home Medications:  Current Outpatient Medications on File Prior to Visit   Medication Sig   • acetaminophen (TYLENOL) 325 MG tablet Take 2 tablets by mouth Daily As Needed.   • amLODIPine (NORVASC) 10 MG tablet Take 1 tablet by mouth Daily.   • cholecalciferol (VITAMIN D3) 25 MCG (1000 UT) tablet Take 1 tablet by mouth Daily.   • glucose blood test strip 1 each by Other route Daily.   • hydroCHLOROthiazide (MICROZIDE) 12.5 MG capsule Take 2 capsules by mouth Daily.   • lisinopril (PRINIVIL,ZESTRIL) 40 MG tablet Take 1 tablet by mouth Daily for 30 days.   • metFORMIN (GLUCOPHAGE) 1000 MG tablet Take 1 tablet by mouth 2 (two) times a day.   • Omega-3 Fatty Acids (fish oil) 1000 MG capsule capsule Take 1,200 mg by mouth Daily With Breakfast.   • pantoprazole (PROTONIX) 40 MG EC tablet Take 1 tablet by mouth Every Morning.   • [DISCONTINUED] metoprolol succinate XL (TOPROL-XL) 50 MG 24 hr tablet TAKE ONE TABLET BY MOUTH EVERY 12 HOURS     No current facility-administered medications on file prior to visit.       Past Medical History:   Diagnosis Date   • Diabetes mellitus    • Elevated cholesterol    • Essential hypertension 2021   • Palpitations 2021   • Stroke        Allergies:  Allergies   Allergen Reactions   • Statins GI Intolerance        Objective    Objective       Vitals:   Heart Rate:  [66] 66  BP: (154)/(78) 154/78  Body mass index is 30.82 kg/m².     PHYSICAL EXAM:    General Appearance:   · well developed  · well nourished  HENT:   · oropharynx moist  · lips not cyanotic  Neck:  · thyroid not enlarged  · supple  Respiratory:  · no respiratory distress  · normal breath sounds  · no rales  Cardiovascular:  · no jugular venous distention  · regular rhythm  · apical impulse normal  · S1 normal, S2 normal  · no S3, no S4   · no murmur  · no rub, no thrill  · carotid pulses normal; no bruit  · pedal pulses normal  · lower extremity edema: none    Skin:   · warm, dry  Psychiatric:  · judgement and insight appropriate  · normal mood and affect        Result Review:  I have personally reviewed the available results from  [x]  Laboratory  [x]  EKG  [x]  Cardiology  [x]  Medications  [x]  Old records  []  Other:     Procedures  Lab Results   Component Value Date    CHOL 162 10/19/2021     Lab Results   Component Value Date    TRIG 202 (H) 10/19/2021    TRIG 229 (H) 06/08/2020     Lab Results   Component Value Date    HDL 38 (L) 10/19/2021    HDL 44 (L) 06/08/2020     Lab Results   Component Value Date    LDL 90 10/19/2021     (H) 06/08/2020     Lab Results   Component Value Date    VLDL 34 10/19/2021    VLDL 46 (H) 06/08/2020     Results for orders placed during the hospital encounter of 10/18/21    Adult Transthoracic Echo Complete W/ Cont if Necessary Per Protocol (With Agitated Saline)    Interpretation Summary  · Calculated left ventricular EF = 70% Estimated left ventricular EF was in agreement with the calculated left ventricular EF.  · Left ventricular diastolic function is consistent with (grade I) impaired relaxation.  · No hemodynamically significant valvular pathology.     Impression/Plan:  1.  Stable palpitations: Change metoprolol to carvedilol 12.5 mg twice a day.  No palpitations.  2.  Essential hypertension Uncontrolled:  Continue amlodipine 10 mg once a day.  Continue Zestril 40 mg once a day.  We will change metoprolol to carvedilol 12.5 mg twice a day and see if blood pressure is better controlled. monitor blood pressure regularly           Clifford Donald MD   04/25/23   09:07 EDT

## 2023-04-25 ENCOUNTER — OFFICE VISIT (OUTPATIENT)
Dept: CARDIOLOGY | Facility: CLINIC | Age: 73
End: 2023-04-25
Payer: MEDICARE

## 2023-04-25 VITALS
SYSTOLIC BLOOD PRESSURE: 154 MMHG | DIASTOLIC BLOOD PRESSURE: 78 MMHG | HEIGHT: 63 IN | HEART RATE: 66 BPM | WEIGHT: 174 LBS | BODY MASS INDEX: 30.83 KG/M2

## 2023-04-25 DIAGNOSIS — R00.2 PALPITATIONS: ICD-10-CM

## 2023-04-25 DIAGNOSIS — I10 HYPERTENSION, ESSENTIAL: Primary | ICD-10-CM

## 2023-04-25 PROCEDURE — 3078F DIAST BP <80 MM HG: CPT | Performed by: SPECIALIST

## 2023-04-25 PROCEDURE — 1159F MED LIST DOCD IN RCRD: CPT | Performed by: SPECIALIST

## 2023-04-25 PROCEDURE — 1160F RVW MEDS BY RX/DR IN RCRD: CPT | Performed by: SPECIALIST

## 2023-04-25 PROCEDURE — 3077F SYST BP >= 140 MM HG: CPT | Performed by: SPECIALIST

## 2023-04-25 PROCEDURE — 99214 OFFICE O/P EST MOD 30 MIN: CPT | Performed by: SPECIALIST

## 2023-04-25 RX ORDER — CARVEDILOL 12.5 MG/1
12.5 TABLET ORAL 2 TIMES DAILY WITH MEALS
Qty: 180 TABLET | Refills: 3 | Status: SHIPPED | OUTPATIENT
Start: 2023-04-25

## 2023-06-28 NOTE — PROGRESS NOTES
Outpatient Speech Language Pathology   Adult Speech Language Cognitive Treatment Note/Discharge Statement       Patient Name: Kimber Carranza  : 1950  MRN: [id number]  Today's Date: 2021         Visit Date: 2021         Today Visit number: 10  Medical Diagnosis:CVA  Treatment Diagnosis:Cognitive  Communication Deficits      SUBJECTIVE  Patient/Therapist Comments: Patient indicated she now has her confidence back and feels her memory and visual attention are better.   Education:Educated patient on home activities she can complete and gave her a packet of home exercises she can complete.     Patient is here today to increase memory recall and attention for safe completion of IADLs.           SPEECH THERAPY    GOALS ACTIVITY PERFORMED TRIALS COMPLETED LEVEL OF CUEING REQUIRED   STG 1a: 8 weeks: Patient will demonstrate the ability to use appropriate memory strategies to encode novel and complex information with min cueing.              memory strategies five strategies: write it down, repetition, association, grouping and visualization No assist to recall strategies    * goal met   STG 1b: 8 weeks:  Patient will complete mod complex short term memory tasks with 80% accuracy and/or min assist for strategy use.                STG 1c: 8 weeks: Patient will complete immediate memory tasks with 80% accuracy and min assist for strategy use.                 Short term recall              Immediate recall   Delayed recall (45 minutes) appointments: 1616    Recall of previous session rules of Sodoku        Recall of two physicians appointments this week. 15/16   Min assist    * goal met               Min assist    * goal met     STG 2a: 8 weeks.  Patient will complete visual attention tasks with min assist to attend to multiple information required to make appropriate and timely decisions for ADLS.         Visual attention Sodoku- scanning vertically, horizontally and within the blocks while holding  information in memory.       Min assist for visual scanning and attention    * goal met                             ASSESSMENT/PLAN  Need for skilled care:  Patient requires the skills of a therapist to provide reduced cueing to no to min  assist to use memory strategies and for visual attention for moderately complex tasks to encode information for safe completion of IADLs.     Progress towards goals: Patient met all goals and indicated she is confident in her memory and visual attention/scanning.        Barriers to Achieving Goals:  Left neglect    Changes to Plan:  discharge      Time: 11:45-12:48 = 63 minutes         Reta Scruggs MS, CCC-SLP              Speech Discharge Statement        Outpatient Speech Language Pathology   Adult Speech Language/Cognition Discharge     Patient Name: Kimber Carranza  : 1950  MRN: [id number]  Today's Date: 2021         Visit Date: 2021        Visit Dx:    ICD-10-CM ICD-9-CM   1. Cognitive communication deficit  R41.841 799.52       Patient's History:Speech therapy to address cognitive communication disrupations    Reason for Discharge:Patient has met all goals.       Discharge Instructions:   Continue home exercises such as Sodoku, cooking, planning activities, using monthly agenda     Functional Communication Measures:  Patient is rated at discharge a level 7/7 on Functional Communication Measures for memory with a 0% limitation.        Assessment:    See above      Speech Therapy Goals:  1.  Memory Limitation of 20-39%; FCM 5/7                 LTG 1: 8 weeks: Patient will increase Functional Communication Measure Score for memory to 7/7 decreasing limitation to 0 %.                                  STATUS:  Goal met              STG 1a: 8 weeks: Patient will demonstrate the ability to use appropriate memory strategies to encode novel and complex information with men cueing.              STATUS:  Goal met              STG 1b: 8 weeks:  Patient will  complete mod complex short term memory tasks with 80% accuracy and/or min assist for strategy use.                          STATUS: Goal met              STG 1c: 8 weeks: Patient will complete immediate memory tasks with 80% accuracy and min assist for strategy use.                          STATUS: Goal met              TREATMENT:  Speech Therapy to increase memory for safe completion of IADL tasks.                      2. Attention disturbance              LTG 2: 8 weeks: Patient will increase attention to encode information and increase accurate processing of information for moderate complex decision making skills during ADLS.         STATUS: Goal met              STG 2a: 8 weeks.  Patient will complete visual attention tasks with min assist to attend to multiple information required to make appropriate and timely decisions for ADLS.              STATUS: Goal met              Treatment:  Speech therapy to increase attention for safe completion of IADLs.          Recommendations: Discharge             ST Plan,DC  PLAN         Glycopyrrolate Pregnancy And Lactation Text: This medication is Pregnancy Category B and is considered safe during pregnancy. It is unknown if it is excreted breast milk.

## 2023-11-22 ENCOUNTER — APPOINTMENT (OUTPATIENT)
Dept: CT IMAGING | Facility: HOSPITAL | Age: 73
End: 2023-11-22
Payer: MEDICARE

## 2023-11-22 ENCOUNTER — HOSPITAL ENCOUNTER (EMERGENCY)
Facility: HOSPITAL | Age: 73
Discharge: HOME | End: 2023-11-23
Attending: EMERGENCY MEDICINE
Payer: MEDICARE

## 2023-11-22 ENCOUNTER — APPOINTMENT (OUTPATIENT)
Dept: GENERAL RADIOLOGY | Facility: HOSPITAL | Age: 73
End: 2023-11-22
Payer: MEDICARE

## 2023-11-22 DIAGNOSIS — S29.012A UPPER BACK STRAIN, INITIAL ENCOUNTER: ICD-10-CM

## 2023-11-22 DIAGNOSIS — R10.9 ABDOMINAL CRAMPING: ICD-10-CM
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
## 2023-11-22 LAB
ALBUMIN SERPL-MCNC: 4.8 G/DL (ref 3.5–5.2)
ALBUMIN/GLOB SERPL: 1.6 G/DL
ALP SERPL-CCNC: 53 U/L (ref 39–117)
ALT SERPL W P-5'-P-CCNC: 15 U/L (ref 1–33)
ANION GAP SERPL CALCULATED.3IONS-SCNC: 17.3 MMOL/L (ref 5–15)
APTT PPP: 26.3 SECONDS (ref 24.2–34.2)
AST SERPL-CCNC: 17 U/L (ref 1–32)
BASOPHILS # BLD AUTO: 0.04 10*3/MM3 (ref 0–0.2)
BASOPHILS NFR BLD AUTO: 0.3 % (ref 0–1.5)
BILIRUB SERPL-MCNC: 0.2 MG/DL (ref 0–1.2)
BUN SERPL-MCNC: 21 MG/DL (ref 8–23)
BUN/CREAT SERPL: 18.8 (ref 7–25)
CALCIUM SPEC-SCNC: 10.2 MG/DL (ref 8.6–10.5)
CHLORIDE SERPL-SCNC: 100 MMOL/L (ref 98–107)
CO2 SERPL-SCNC: 24.7 MMOL/L (ref 22–29)
CREAT SERPL-MCNC: 1.12 MG/DL (ref 0.57–1)
DEPRECATED RDW RBC AUTO: 41.3 FL (ref 37–54)
EGFRCR SERPLBLD CKD-EPI 2021: 51.4 ML/MIN/1.73
EOSINOPHIL # BLD AUTO: 0.2 10*3/MM3 (ref 0–0.4)
EOSINOPHIL NFR BLD AUTO: 1.6 % (ref 0.3–6.2)
ERYTHROCYTE [DISTWIDTH] IN BLOOD BY AUTOMATED COUNT: 13.1 % (ref 12.3–15.4)
GLOBULIN UR ELPH-MCNC: 3 GM/DL
GLUCOSE SERPL-MCNC: 177 MG/DL (ref 65–99)
HCT VFR BLD AUTO: 38 % (ref 34–46.6)
HGB BLD-MCNC: 12.5 G/DL (ref 12–15.9)
HOLD SPECIMEN: NORMAL
HOLD SPECIMEN: NORMAL
IMM GRANULOCYTES # BLD AUTO: 0.07 10*3/MM3 (ref 0–0.05)
IMM GRANULOCYTES NFR BLD AUTO: 0.5 % (ref 0–0.5)
INR PPP: 1.02 (ref 0.86–1.15)
LYMPHOCYTES # BLD AUTO: 2.49 10*3/MM3 (ref 0.7–3.1)
LYMPHOCYTES NFR BLD AUTO: 19.5 % (ref 19.6–45.3)
MCH RBC QN AUTO: 28.3 PG (ref 26.6–33)
MCHC RBC AUTO-ENTMCNC: 32.9 G/DL (ref 31.5–35.7)
MCV RBC AUTO: 86 FL (ref 79–97)
MONOCYTES # BLD AUTO: 0.84 10*3/MM3 (ref 0.1–0.9)
MONOCYTES NFR BLD AUTO: 6.6 % (ref 5–12)
NEUTROPHILS NFR BLD AUTO: 71.5 % (ref 42.7–76)
NEUTROPHILS NFR BLD AUTO: 9.16 10*3/MM3 (ref 1.7–7)
NRBC BLD AUTO-RTO: 0 /100 WBC (ref 0–0.2)
PLATELET # BLD AUTO: 262 10*3/MM3 (ref 140–450)
PMV BLD AUTO: 10 FL (ref 6–12)
POTASSIUM SERPL-SCNC: 3.5 MMOL/L (ref 3.5–5.2)
PROT SERPL-MCNC: 7.8 G/DL (ref 6–8.5)
PROTHROMBIN TIME: 13.7 SECONDS (ref 11.8–14.9)
RBC # BLD AUTO: 4.42 10*6/MM3 (ref 3.77–5.28)
SODIUM SERPL-SCNC: 142 MMOL/L (ref 136–145)
WBC NRBC COR # BLD AUTO: 12.8 10*3/MM3 (ref 3.4–10.8)
WHOLE BLOOD HOLD COAG: NORMAL
WHOLE BLOOD HOLD SPECIMEN: NORMAL

## 2023-11-22 PROCEDURE — 73502 X-RAY EXAM HIP UNI 2-3 VIEWS: CPT

## 2023-11-22 PROCEDURE — 25510000001 IOPAMIDOL PER 1 ML: Performed by: EMERGENCY MEDICINE

## 2023-11-22 PROCEDURE — 85025 COMPLETE CBC W/AUTO DIFF WBC: CPT | Performed by: EMERGENCY MEDICINE

## 2023-11-22 PROCEDURE — 99285 EMERGENCY DEPT VISIT HI MDM: CPT

## 2023-11-22 PROCEDURE — 71260 CT THORAX DX C+: CPT

## 2023-11-22 PROCEDURE — 85730 THROMBOPLASTIN TIME PARTIAL: CPT | Performed by: EMERGENCY MEDICINE

## 2023-11-22 PROCEDURE — 85610 PROTHROMBIN TIME: CPT | Performed by: EMERGENCY MEDICINE

## 2023-11-22 PROCEDURE — 96374 THER/PROPH/DIAG INJ IV PUSH: CPT

## 2023-11-22 PROCEDURE — 74177 CT ABD & PELVIS W/CONTRAST: CPT

## 2023-11-22 PROCEDURE — 80053 COMPREHEN METABOLIC PANEL: CPT | Performed by: EMERGENCY MEDICINE

## 2023-11-22 PROCEDURE — 72125 CT NECK SPINE W/O DYE: CPT

## 2023-11-22 PROCEDURE — 70450 CT HEAD/BRAIN W/O DYE: CPT

## 2023-11-22 PROCEDURE — 25010000002 ORPHENADRINE CITRATE PER 60 MG: Performed by: EMERGENCY MEDICINE

## 2023-11-22 PROCEDURE — 36415 COLL VENOUS BLD VENIPUNCTURE: CPT

## 2023-11-22 RX ORDER — SODIUM CHLORIDE 0.9 % (FLUSH) 0.9 %
10 SYRINGE (ML) INJECTION AS NEEDED
Status: DISCONTINUED | OUTPATIENT
Start: 2023-11-22 | End: 2023-11-23 | Stop reason: HOSPADM

## 2023-11-22 RX ORDER — ORPHENADRINE CITRATE 30 MG/ML
60 INJECTION INTRAMUSCULAR; INTRAVENOUS ONCE
Status: DISCONTINUED | OUTPATIENT
Start: 2023-11-22 | End: 2023-11-22

## 2023-11-22 RX ORDER — ORPHENADRINE CITRATE 30 MG/ML
60 INJECTION INTRAMUSCULAR; INTRAVENOUS ONCE
Status: COMPLETED | OUTPATIENT
Start: 2023-11-22 | End: 2023-11-22

## 2023-11-22 RX ADMIN — ORPHENADRINE CITRATE 60 MG: 60 INJECTION INTRAMUSCULAR; INTRAVENOUS at 23:11

## 2023-11-22 RX ADMIN — IOPAMIDOL 94 ML: 755 INJECTION, SOLUTION INTRAVENOUS at 23:04

## 2023-11-23 VITALS
TEMPERATURE: 98 F | HEIGHT: 62 IN | OXYGEN SATURATION: 92 % | WEIGHT: 170 LBS | BODY MASS INDEX: 31.28 KG/M2 | DIASTOLIC BLOOD PRESSURE: 67 MMHG | RESPIRATION RATE: 16 BRPM | SYSTOLIC BLOOD PRESSURE: 147 MMHG | HEART RATE: 86 BPM

## 2023-11-23 RX ORDER — ORPHENADRINE CITRATE 100 MG/1
100 TABLET, EXTENDED RELEASE ORAL 2 TIMES DAILY PRN
Qty: 10 TABLET | Refills: 0 | Status: SHIPPED | OUTPATIENT
Start: 2023-11-23

## 2023-11-23 NOTE — ED PROVIDER NOTES
Time: 9:00 PM EST  Date of encounter:  11/22/2023  Independent Historian/Clinical History and Information was obtained by:   Patient    History is limited by: N/A    Chief Complaint   Patient presents with    Fall    Head Injury         History of Present Illness:  Patient is a 73 y.o. year old female who presents to the emergency department for evaluation of headache after patient fell down 13 stairs and hit her head while carrying too much stuff per the patient.  Patient denies loss of consciousness, vision changes, nausea/vomiting.  Patient states she had a brain bleed 2 years ago and wants to make sure that she is not having one again.  Patient states she is not on blood thinners.  Patient also reports neck pain and left hip pain.  Patient was seen by provider in triage, Christophe Nash PA-C    Patient Care Team  Primary Care Provider: Kelsie Maier APRN    Past Medical History:     Allergies   Allergen Reactions    Statins GI Intolerance     Past Medical History:   Diagnosis Date    Diabetes mellitus     Elevated cholesterol     Essential hypertension 7/28/2021    Palpitations 7/28/2021    Stroke      Past Surgical History:   Procedure Laterality Date    HYSTERECTOMY       Family History   Problem Relation Age of Onset    Hypertension Father        Home Medications:  Prior to Admission medications    Medication Sig Start Date End Date Taking? Authorizing Provider   acetaminophen (TYLENOL) 325 MG tablet Take 2 tablets by mouth Daily As Needed. 10/28/21   Danny Farris MD   amLODIPine (NORVASC) 10 MG tablet Take 1 tablet by mouth Daily. 7/18/22   Danny Farris MD   carvedilol (COREG) 12.5 MG tablet Take 1 tablet by mouth 2 (Two) Times a Day With Meals. 4/25/23   Clifford Donald MD   cholecalciferol (VITAMIN D3) 25 MCG (1000 UT) tablet Take 1 tablet by mouth Daily.    Danny Farris MD   glucose blood test strip 1 each by Other route Daily. 1/25/22   Danny Farris MD  "  hydroCHLOROthiazide (MICROZIDE) 12.5 MG capsule Take 2 capsules by mouth Daily. 9/27/22   Clifford Donald MD   lisinopril (PRINIVIL,ZESTRIL) 40 MG tablet Take 1 tablet by mouth Daily for 30 days. 10/23/21 4/25/23  Morenita Lovett MD   metFORMIN (GLUCOPHAGE) 1000 MG tablet Take 1 tablet by mouth 2 (two) times a day. 7/28/21   ProviderDanny MD   Omega-3 Fatty Acids (fish oil) 1000 MG capsule capsule Take 1,200 mg by mouth Daily With Breakfast.    ProviderDanny MD   pantoprazole (PROTONIX) 40 MG EC tablet Take 1 tablet by mouth Every Morning. 10/23/21   Morenita Lovett MD        Social History:   Social History     Tobacco Use    Smoking status: Never    Smokeless tobacco: Never   Vaping Use    Vaping Use: Never used   Substance Use Topics    Alcohol use: Never    Drug use: Never         Review of Systems:  Review of Systems   Musculoskeletal:  Positive for arthralgias and neck pain.   Neurological:  Positive for headaches.        Physical Exam:  /67 (BP Location: Right arm, Patient Position: Sitting)   Pulse 86   Temp 98 °F (36.7 °C) (Oral)   Resp 16   Ht 157.5 cm (62\")   Wt 77.1 kg (170 lb)   SpO2 92%   BMI 31.09 kg/m²         Physical Exam  HENT:      Head: Normocephalic.      Mouth/Throat:      Mouth: Mucous membranes are moist.   Eyes:      Pupils: Pupils are equal, round, and reactive to light.   Pulmonary:      Effort: Pulmonary effort is normal.   Abdominal:      General: There is no distension.   Musculoskeletal:         General: Tenderness (Appreciated upon palpation to cervical spine and left hip.) present.      Cervical back: Neck supple.   Skin:     General: Skin is warm and dry.   Neurological:      General: No focal deficit present.      Mental Status: She is alert and oriented to person, place, and time.   Psychiatric:         Mood and Affect: Mood normal.         Behavior: Behavior normal.                      Procedures:  Procedures      Medical Decision " Making:      Comorbidities that affect care:    Diabetes, Hypertension    External Notes reviewed:    None      The following orders were placed and all results were independently analyzed by me:  Orders Placed This Encounter   Procedures    CT Head Without Contrast    CT Cervical Spine Without Contrast    XR Hip With or Without Pelvis 2 - 3 View Left    CT Abdomen Pelvis With Contrast    CT Chest With Contrast Diagnostic    [place] Draw    Comprehensive Metabolic Panel    CBC Auto Differential    Protime-INR    aPTT    CBC & Differential    Green Top (Gel)    Lavender Top    Gold Top - SST    Light Blue Top       Medications Given in the Emergency Department:  Medications   orphenadrine (NORFLEX) injection 60 mg (60 mg Intravenous Given 11/22/23 2311)   iopamidol (ISOVUE-370) 76 % injection 100 mL (94 mL Intravenous Given 11/22/23 2304)        ED Course:    The patient was initially evaluated in the triage area where orders were placed. The patient was later dispositioned by Jose Ayala DO.      The patient was advised to stay for completion of workup which includes but is not limited to communication of labs and radiological results, reassessment and plan. The patient was advised that leaving prior to disposition by a provider could result in critical findings that are not communicated to the patient.          Labs:    Lab Results (last 24 hours)       Procedure Component Value Units Date/Time    CBC & Differential [[id number]]  (Abnormal) Collected: 11/22/23 2143    Specimen: Blood Updated: 11/22/23 2151    Narrative:      The following orders were created for panel order CBC & Differential.  Procedure                               Abnormality         Status                     ---------                               -----------         ------                     CBC Auto Differential[[id number]]        Abnormal            Final result                 Please view results for these tests on the individual orders.     Comprehensive Metabolic Panel [[id number]]  (Abnormal) Collected: 11/22/23 2143    Specimen: Blood Updated: 11/22/23 2211     Glucose 177 mg/dL      BUN 21 mg/dL      Creatinine 1.12 mg/dL      Sodium 142 mmol/L      Potassium 3.5 mmol/L      Comment: Slight hemolysis detected by analyzer. Result may be falsely elevated.        Chloride 100 mmol/L      CO2 24.7 mmol/L      Calcium 10.2 mg/dL      Total Protein 7.8 g/dL      Albumin 4.8 g/dL      ALT (SGPT) 15 U/L      AST (SGOT) 17 U/L      Alkaline Phosphatase 53 U/L      Total Bilirubin 0.2 mg/dL      Globulin 3.0 gm/dL      A/G Ratio 1.6 g/dL      BUN/Creatinine Ratio 18.8     Anion Gap 17.3 mmol/L      eGFR 51.4 mL/min/1.73     Narrative:      GFR Normal >60  Chronic Kidney Disease <60  Kidney Failure <15    The GFR formula is only valid for adults with stable renal function between ages 18 and 70.    CBC Auto Differential [[id number]]  (Abnormal) Collected: 11/22/23 2143    Specimen: Blood Updated: 11/22/23 2151     WBC 12.80 10*3/mm3      RBC 4.42 10*6/mm3      Hemoglobin 12.5 g/dL      Hematocrit 38.0 %      MCV 86.0 fL      MCH 28.3 pg      MCHC 32.9 g/dL      RDW 13.1 %      RDW-SD 41.3 fl      MPV 10.0 fL      Platelets 262 10*3/mm3      Neutrophil % 71.5 %      Lymphocyte % 19.5 %      Monocyte % 6.6 %      Eosinophil % 1.6 %      Basophil % 0.3 %      Immature Grans % 0.5 %      Neutrophils, Absolute 9.16 10*3/mm3      Lymphocytes, Absolute 2.49 10*3/mm3      Monocytes, Absolute 0.84 10*3/mm3      Eosinophils, Absolute 0.20 10*3/mm3      Basophils, Absolute 0.04 10*3/mm3      Immature Grans, Absolute 0.07 10*3/mm3      nRBC 0.0 /100 WBC     Protime-INR [[id number]]  (Normal) Collected: 11/22/23 2143    Specimen: Blood Updated: 11/22/23 2219     Protime 13.7 Seconds      INR 1.02    Narrative:      Suggested Therapeutic Ranges For Oral Anticoagulant Therapy:  Level of Therapy                      INR Target Range  Standard Dose                             2.0-3.0  High Dose                                2.5-3.5  Patients not receiving anticoagulant  Therapy Normal Range                     0.86-1.15    aPTT [[id number]]  (Normal) Collected: 11/22/23 2143    Specimen: Blood Updated: 11/22/23 2219     PTT 26.3 seconds              Imaging:    CT Abdomen Pelvis With Contrast    Result Date: 11/22/2023  PROCEDURE: CT ABDOMEN PELVIS W CONTRAST  COMPARISON: [facility], CT, ABDOMEN/PELVIS WITH CONTRAST, 11/19/2014, 16:55.  INDICATIONS: Upper abdominal pain post fall down 13 stairs  TECHNIQUE: After obtaining the patient's consent, CT images were created with non-ionic intravenous contrast material.   PROTOCOL:   Standard imaging protocol performed    RADIATION:   DLP: 1801.4 mGy*cm   Automated exposure control was utilized to minimize radiation dose. CONTRAST: 93 cc Isovue 370 I.V. LABS:   eGFR: 51.4 ml/min/1.73m2  FINDINGS:  Liver, gallbladder, pancreas, spleen, adrenal glands, right kidney appear unremarkable.  Punctate nonobstructing left lower renal calculus measures 2 mm. No abnormal bowel distention is seen.  There is colonic diverticulosis without CT findings of diverticulitis.  There is no urinary bladder wall thickening.  Uterus is not seen.  There is no significant free fluid or free air.  No acute osseous abnormality is identified.  There are multilevel degenerative changes in the lumbar spine, most severely affecting the facet joints.        No acute abnormality identified in the abdomen/pelvis.     ADELINA OROSCO MD       Electronically Signed and Approved By: ADELINA OROSCO MD on 11/22/2023 at 23:38             CT Chest With Contrast Diagnostic    Result Date: 11/22/2023  PROCEDURE: CT CHEST W CONTRAST DIAGNOSTIC  COMPARISON:  [facility], CT, ABDOMEN/PELVIS WITH CONTRAST, 11/19/2014, 16:55.  INDICATIONS: NO CHEST COMPLAINTS TODAY. POST FALL DOWN 13 STAIRS.  TECHNIQUE: After obtaining the patient's consent, CT images were  obtained with non-ionic intravenous contrast material.   PROTOCOL:   Standard imaging protocol performed    RADIATION:   DLP: 433.8 mGy*cm   Automated exposure control was utilized to minimize radiation dose. CONTRAST: 93 cc Isovue 370 I.V. LABS:   eGFR: 51.4 ml/min/1.73m2  FINDINGS:  There is no evidence of acute thoracic aortic injury.  Heart size is borderline.  No pericardial or pleural fluid.  Lungs are clear.  No pneumothorax.  Partially included solid organs of the upper abdomen appear unremarkable for the phase of contrast enhancement.  No acute osseous abnormality seen.        No acute abnormality identified in the chest.     ADELINA OROSCO MD       Electronically Signed and Approved By: ADELINA OROSCO MD on 11/22/2023 at 23:35             XR Hip With or Without Pelvis 2 - 3 View Left    Result Date: 11/22/2023  PROCEDURE: XR HIP W OR WO PELVIS 2-3 VIEW LEFT  COMPARISON: [facility], CT, ABDOMEN/PELVIS WITH CONTRAST, 11/19/2014, 16:55.  INDICATIONS: fell down 13 stairs, pain  FINDINGS:  No acute fracture or dislocation is identified.  There is mild bilateral hip joint space narrowing.  Degenerative changes of the lower lumbar spine are included.        No acute fracture or dislocation identified of the left hip.      ADELINA OROSCO MD       Electronically Signed and Approved By: ADELINA OROSCO MD on 11/22/2023 at 21:46             CT Cervical Spine Without Contrast    Result Date: 11/22/2023  PROCEDURE: CT CERVICAL SPINE WO CONTRAST  COMPARISON: None  INDICATIONS: NECK TRAUMA POST FALL. NO NECK COMPLAINTS TODAY, fell down 13 stairs, pain  PROTOCOL:   Standard imaging protocol performed    RADIATION:   DLP: 327.5 mGy*cm   MA and/or KV was adjusted to minimize radiation dose.     TECHNIQUE: After obtaining the patient's consent, multi-planar CT images were created without contrast material.   FINDINGS:  There is straightening of the normal cervical lordosis.  No acute fracture or  subluxation is seen.  There is multilevel disc space narrowing and endplate osteophyte formation at C4-C5, C5-C6, C6-C7.  This combined with multilevel facet arthropathy causes mild to moderate neural foraminal narrowing, greatest on the left at C5-C6.  No abnormal prevertebral soft tissue swelling is seen.  Included lung apices are clear.        No acute fracture or subluxation is identified in the cervical spine.     ADELINA OROSCO MD       Electronically Signed and Approved By: ADELINA OROSCO MD on 11/22/2023 at 21:34             CT Head Without Contrast    Result Date: 11/22/2023  PROCEDURE: CT HEAD WO CONTRAST  COMPARISON:  [facility], CT, CT HEAD WO CONTRAST, 10/19/2021, 7:43.  INDICATIONS: HEAD TRAUMA POST FALL. NO HEAD COMPLAINTS TODAY, falling down 13 stairs  PROTOCOL:   Standard imaging protocol performed    RADIATION:   DLP: 1016 mGy*cm   MA and/or KV was adjusted to minimize radiation dose.     TECHNIQUE: After obtaining the patient's consent, CT images were obtained without non-ionic intravenous contrast material.  FINDINGS:  No acute intracranial hemorrhage, mass, or mass effect is seen.  There are areas of encephalomalacia in the right parietal occipital region and right temporal occipital region at sites of previous intraparenchymal hemorrhage seen on 2021 CT.  Ventricles are nondilated.  No midline shift.  No fractures are seen.  Included paranasal sinuses and mastoid air cells are clear.        No acute intracranial abnormality is identified.     ADELINA OROSCO MD       Electronically Signed and Approved By: ADELINA OROSOC MD on 11/22/2023 at 21:31                Differential Diagnosis and Discussion:      Trauma:  Differential diagnosis considered but not limited to were subarachnoid hemorrhage, intracranial bleeding, pneumothorax, cardiac contusion, lung contusion, intra-abdominal bleeding, and compartment syndrome of any extremity or other significant traumatic  pathology    All labs were reviewed and interpreted by me.  All X-rays impressions were independently interpreted by me.  CT scan radiology impression was interpreted by me.    MDM  No significant acute injuries were found on diagnostic imaging.  Patient's pain was controlled and she is considered stable for discharge.        Patient Care Considerations:    NARCOTICS: I considered prescribing opiate pain medication as an outpatient, however patient declined      Consultants/Shared Management Plan:    None    Social Determinants of Health:    Patient has presented with family members who are responsible, reliable and will ensure follow up care.      Disposition and Care Coordination:    Discharged: The patient is suitable and stable for discharge with no need for consideration of observation or admission.    I have explained the patient´s condition, diagnoses and treatment plan based on the information available to me at this time. I have answered questions and addressed any concerns. The patient has a good  understanding of the patient´s diagnosis, condition, and treatment plan as can be expected at this point. The vital signs have been stable. The patient´s condition is stable and appropriate for discharge from the emergency department.      The patient will pursue further outpatient evaluation with the primary care physician or other designated or consulting physician as outlined in the discharge instructions. They are agreeable to this plan of care and follow-up instructions have been explained in detail. The patient has received these instructions in written format and have expressed an understanding of the discharge instructions. The patient is aware that any significant change in condition or worsening of symptoms should prompt an immediate return to this or the closest emergency department or call to 911.  I have explained discharge medications and the need for follow up with the patient/caretakers. This was  also printed in the discharge instructions. Patient was discharged with the following medications and follow up:      Medication List        New Prescriptions      orphenadrine 100 MG 12 hr tablet  Commonly known as: NORFLEX  Take 1 tablet by mouth 2 (Two) Times a Day As Needed for Muscle Spasms.               Where to Get Your Medications        These medications were sent to [facility]/pharmacy #[id number] - [place], [place] - [address] - [phone]  - [phone] FX  [address] [place] [address]      Hours: 24-hours Phone: [phone]   orphenadrine 100 MG 12 hr tablet      Darrion, Kelsie Dagoberto, APRN  [address]  [address]  [facility] [address] [phone]      As needed       Final diagnoses:   Fall down stairs, initial encounter   Upper back strain, initial encounter   Abdominal cramping        ED Disposition       ED Disposition   Discharge    Condition   Stable    Comment   --               This medical record created using voice recognition software.             Jose Ayala DO  11/23/23 7628

## 2023-11-25 NOTE — PROGRESS NOTES
[facility]  Cardiology progress Note    Patient Name: Kimber Carranza  : 1950    CHIEF COMPLAINT  Palpitations        Subjective   Subjective     HISTORY OF PRESENT ILLNESS    Kimber Carranza is a 73 y.o. female with history of palpitations and hypertension.  No further palpitations.    REVIEW OF SYSTEMS    Constitutional:    No fever, no weight loss  Skin:     No rash  Otolaryngeal:    No difficulty swallowing  Cardiovascular: See HPI.  Pulmonary:    No cough, no sputum production    Personal History     Social History:    reports that she has never smoked. She has never used smokeless tobacco. She reports that she does not drink alcohol and does not use drugs.    Home Medications:  Current Outpatient Medications on File Prior to Visit   Medication Sig    acetaminophen (TYLENOL) 325 MG tablet Take 2 tablets by mouth Daily As Needed.    amLODIPine (NORVASC) 10 MG tablet Take 1 tablet by mouth Daily.    carvedilol (COREG) 12.5 MG tablet Take 1 tablet by mouth 2 (Two) Times a Day With Meals.    cholecalciferol (VITAMIN D3) 25 MCG (1000 UT) tablet Take 1 tablet by mouth Daily.    glucose blood test strip 1 each by Other route Daily.    hydroCHLOROthiazide (MICROZIDE) 12.5 MG capsule Take 2 capsules by mouth Daily.    lisinopril (PRINIVIL,ZESTRIL) 40 MG tablet Take 1 tablet by mouth Daily for 30 days.    metFORMIN (GLUCOPHAGE) 1000 MG tablet Take 1 tablet by mouth 2 (two) times a day.    Omega-3 Fatty Acids (fish oil) 1000 MG capsule capsule Take 1,200 mg by mouth Daily With Breakfast.    orphenadrine (NORFLEX) 100 MG 12 hr tablet Take 1 tablet by mouth 2 (Two) Times a Day As Needed for Muscle Spasms.    pantoprazole (PROTONIX) 40 MG EC tablet Take 1 tablet by mouth Every Morning.     No current facility-administered medications on file prior to visit.       Past Medical History:   Diagnosis Date    Diabetes mellitus     Elevated cholesterol     Essential hypertension 2021    Palpitations 2021     Stroke        Allergies:  Allergies   Allergen Reactions    Statins GI Intolerance       Objective    Objective       Vitals:   Heart Rate:  [65] 65  BP: (145)/(72) 145/72  Body mass index is 31.28 kg/m².     PHYSICAL EXAM:    General Appearance:   well developed  well nourished  HENT:   oropharynx moist  lips not cyanotic  Neck:  thyroid not enlarged  supple  Respiratory:  no respiratory distress  normal breath sounds  no rales  Cardiovascular:  no jugular venous distention  regular rhythm  apical impulse normal  S1 normal, S2 normal  no S3, no S4   no murmur  no rub, no thrill  carotid pulses normal; no bruit  pedal pulses normal  lower extremity edema: none    Skin:   warm, dry  Psychiatric:  judgement and insight appropriate  normal mood and affect        Result Review:  I have personally reviewed the available results from  [x]  Laboratory  [x]  EKG  [x]  Cardiology  [x]  Medications  [x]  Old records  []  Other:     Procedures  Lab Results   Component Value Date    CHOL 162 10/19/2021     Lab Results   Component Value Date    TRIG 202 (H) 10/19/2021    TRIG 229 (H) 06/08/2020     Lab Results   Component Value Date    HDL 38 (L) 10/19/2021    HDL 44 (L) 06/08/2020     Lab Results   Component Value Date    LDL 90 10/19/2021     (H) 06/08/2020     Lab Results   Component Value Date    VLDL 34 10/19/2021    VLDL 46 (H) 06/08/2020     Results for orders placed during the hospital encounter of 10/18/21    Adult Transthoracic Echo Complete W/ Cont if Necessary Per Protocol (With Agitated Saline)    Interpretation Summary  · Calculated left ventricular EF = 70% Estimated left ventricular EF was in agreement with the calculated left ventricular EF.  · Left ventricular diastolic function is consistent with (grade I) impaired relaxation.  · No hemodynamically significant valvular pathology.     Impression/Plan:  1.  Stable palpitations: Increase carvedilol to 25 mg twice a day.  No further palpitations.  2.   Essential hypertension Uncontrolled: Continue amlodipine 10 mg once a day.  Continue Zestril 40 mg once a day.  Increase carvedilol to 25 mg twice a day.  Monitor blood pressure regularly.           Clifford Donald MD   11/28/23   10:51 EST

## 2023-11-28 ENCOUNTER — OFFICE VISIT (OUTPATIENT)
Dept: CARDIOLOGY | Facility: CLINIC | Age: 73
End: 2023-11-28
Payer: MEDICARE

## 2023-11-28 VITALS
HEIGHT: 62 IN | HEART RATE: 65 BPM | DIASTOLIC BLOOD PRESSURE: 72 MMHG | WEIGHT: 171 LBS | BODY MASS INDEX: 31.47 KG/M2 | SYSTOLIC BLOOD PRESSURE: 145 MMHG

## 2023-11-28 DIAGNOSIS — R00.2 PALPITATIONS: Primary | ICD-10-CM

## 2023-11-28 DIAGNOSIS — I10 HYPERTENSION, ESSENTIAL: ICD-10-CM

## 2023-11-28 PROCEDURE — 3077F SYST BP >= 140 MM HG: CPT | Performed by: SPECIALIST

## 2023-11-28 PROCEDURE — 3078F DIAST BP <80 MM HG: CPT | Performed by: SPECIALIST

## 2023-11-28 PROCEDURE — 99214 OFFICE O/P EST MOD 30 MIN: CPT | Performed by: SPECIALIST

## 2023-11-28 PROCEDURE — 1159F MED LIST DOCD IN RCRD: CPT | Performed by: SPECIALIST

## 2023-11-28 PROCEDURE — 1160F RVW MEDS BY RX/DR IN RCRD: CPT | Performed by: SPECIALIST

## 2023-11-28 RX ORDER — CARVEDILOL 25 MG/1
25 TABLET ORAL 2 TIMES DAILY WITH MEALS
Qty: 180 TABLET | Refills: 6 | Status: SHIPPED | OUTPATIENT
Start: 2023-11-28

## 2023-12-21 ENCOUNTER — TELEPHONE (OUTPATIENT)
Dept: CARDIOLOGY | Facility: CLINIC | Age: 73
End: 2023-12-21
Payer: MEDICARE

## 2023-12-21 NOTE — TELEPHONE ENCOUNTER
Received VM from patient c/o issues with medication change.     Attempted to call. No answer. VM left

## 2023-12-22 ENCOUNTER — TRANSCRIBE ORDERS (OUTPATIENT)
Dept: ADMINISTRATIVE | Facility: HOSPITAL | Age: 73
End: 2023-12-22
Payer: MEDICARE

## 2023-12-22 DIAGNOSIS — Z12.31 SCREENING MAMMOGRAM FOR BREAST CANCER: Primary | ICD-10-CM

## 2024-01-02 ENCOUNTER — TELEPHONE (OUTPATIENT)
Dept: CARDIOLOGY | Facility: CLINIC | Age: 74
End: 2024-01-02
Payer: MEDICARE

## 2024-01-02 RX ORDER — CARVEDILOL 12.5 MG/1
12.5 TABLET ORAL 2 TIMES DAILY
Qty: 180 TABLET | Refills: 3 | Status: SHIPPED | OUTPATIENT
Start: 2024-01-02

## 2024-01-02 NOTE — TELEPHONE ENCOUNTER
Continue 12.5 mg dose and continue to monitor BP and HR, notify office of any persistent elevations

## 2024-01-02 NOTE — TELEPHONE ENCOUNTER
Received VM from patient    SW patient. Patient states she cannot tolerate the 25 mg bid Coreg. Patient states causes her to be super dizzy. Patient states she went back to the 12.5 bid and it has resolved. Patient states since being on the 12.5 her systolic's are staying 120s.

## 2024-03-13 ENCOUNTER — HOSPITAL ENCOUNTER (OUTPATIENT)
Dept: MAMMOGRAPHY | Facility: HOSPITAL | Age: 74
Discharge: HOME | End: 2024-03-13
Admitting: NURSE PRACTITIONER
Payer: MEDICARE

## 2024-03-13 DIAGNOSIS — Z12.31 SCREENING MAMMOGRAM FOR BREAST CANCER: ICD-10-CM

## 2024-03-13 PROCEDURE — 77067 SCR MAMMO BI INCL CAD: CPT

## 2024-03-13 PROCEDURE — 77063 BREAST TOMOSYNTHESIS BI: CPT

## 2024-04-16 ENCOUNTER — OFFICE VISIT (OUTPATIENT)
Dept: CARDIOLOGY | Facility: CLINIC | Age: 74
End: 2024-04-16
Payer: MEDICARE

## 2024-04-16 VITALS
HEIGHT: 62 IN | HEART RATE: 72 BPM | SYSTOLIC BLOOD PRESSURE: 136 MMHG | WEIGHT: 167 LBS | DIASTOLIC BLOOD PRESSURE: 60 MMHG | BODY MASS INDEX: 30.73 KG/M2

## 2024-04-16 DIAGNOSIS — I10 HYPERTENSION, ESSENTIAL: Primary | ICD-10-CM

## 2024-04-16 DIAGNOSIS — R00.2 PALPITATIONS: ICD-10-CM

## 2024-04-16 PROCEDURE — 3078F DIAST BP <80 MM HG: CPT | Performed by: SPECIALIST

## 2024-04-16 PROCEDURE — 1159F MED LIST DOCD IN RCRD: CPT | Performed by: SPECIALIST

## 2024-04-16 PROCEDURE — 1160F RVW MEDS BY RX/DR IN RCRD: CPT | Performed by: SPECIALIST

## 2024-04-16 PROCEDURE — 3075F SYST BP GE 130 - 139MM HG: CPT | Performed by: SPECIALIST

## 2024-04-16 PROCEDURE — 99214 OFFICE O/P EST MOD 30 MIN: CPT | Performed by: SPECIALIST

## 2024-04-16 RX ORDER — HYDROCHLOROTHIAZIDE 12.5 MG/1
25 CAPSULE, GELATIN COATED ORAL DAILY
Qty: 90 CAPSULE | Refills: 3 | Status: SHIPPED | OUTPATIENT
Start: 2024-04-16

## 2024-04-16 RX ORDER — CARVEDILOL 12.5 MG/1
12.5 TABLET ORAL 2 TIMES DAILY
Qty: 180 TABLET | Refills: 3 | Status: SHIPPED | OUTPATIENT
Start: 2024-04-16

## 2024-10-02 NOTE — PROGRESS NOTES
[facility]  Cardiology progress Note    Patient Name: Kimber Carranza  : 1950    CHIEF COMPLAINT  Palpitations        Subjective   Subjective     HISTORY OF PRESENT ILLNESS    Kimber Carranza is a 74 y.o. female with history of palpitations.  No further palpitations.    REVIEW OF SYSTEMS    Constitutional:    No fever, no weight loss  Skin:     No rash  Otolaryngeal:    No difficulty swallowing  Cardiovascular: See HPI.  Pulmonary:    No cough, no sputum production    Personal History     Social History:    reports that she has never smoked. She has never used smokeless tobacco. She reports that she does not drink alcohol and does not use drugs.    Home Medications:  Current Outpatient Medications on File Prior to Visit   Medication Sig    acetaminophen (TYLENOL) 325 MG tablet Take 2 tablets by mouth Daily As Needed.    amLODIPine (NORVASC) 10 MG tablet Take 1 tablet by mouth Daily.    carvedilol (COREG) 12.5 MG tablet Take 1 tablet by mouth 2 (Two) Times a Day.    cholecalciferol (VITAMIN D3) 25 MCG (1000 UT) tablet Take 1 tablet by mouth Daily.    glucose blood test strip 1 each by Other route Daily.    hydroCHLOROthiazide (MICROZIDE) 12.5 MG capsule Take 2 capsules by mouth Daily.    lisinopril (PRINIVIL,ZESTRIL) 40 MG tablet Take 1 tablet by mouth Daily for 30 days.    metFORMIN (GLUCOPHAGE) 1000 MG tablet Take 1 tablet by mouth 2 (two) times a day.    Omega-3 Fatty Acids (fish oil) 1000 MG capsule capsule Take 1,200 mg by mouth Daily With Breakfast.    orphenadrine (NORFLEX) 100 MG 12 hr tablet Take 1 tablet by mouth 2 (Two) Times a Day As Needed for Muscle Spasms.    pantoprazole (PROTONIX) 40 MG EC tablet Take 1 tablet by mouth Every Morning.     No current facility-administered medications on file prior to visit.       Past Medical History:   Diagnosis Date    Diabetes mellitus     Elevated cholesterol     Essential hypertension 2021    Palpitations 2021    Stroke         Allergies:  Allergies   Allergen Reactions    Statins GI Intolerance       Objective    Objective       Vitals:   Heart Rate:  [81] 81  BP: (125)/(52) 125/52  Body mass index is 30.73 kg/m².     PHYSICAL EXAM:    General Appearance:   well developed  well nourished  HENT:   oropharynx moist  lips not cyanotic  Neck:  thyroid not enlarged  supple  Respiratory:  no respiratory distress  normal breath sounds  no rales  Cardiovascular:  no jugular venous distention  regular rhythm  apical impulse normal  S1 normal, S2 normal  no S3, no S4   SM GR 3/6 AA  no rub, no thrill  carotid pulses normal; no bruit  pedal pulses normal  lower extremity edema: none    Skin:   warm, dry  Psychiatric:  judgement and insight appropriate  normal mood and affect        Result Review:  I have personally reviewed the available results from  [x]  Laboratory  [x]  EKG  [x]  Cardiology  [x]  Medications  [x]  Old records  []  Other:     Procedures  Lab Results   Component Value Date    CHOL 162 10/19/2021     Lab Results   Component Value Date    TRIG 202 (H) 10/19/2021    TRIG 229 (H) 06/08/2020     Lab Results   Component Value Date    HDL 38 (L) 10/19/2021    HDL 44 (L) 06/08/2020     Lab Results   Component Value Date    LDL 90 10/19/2021     (H) 06/08/2020     Lab Results   Component Value Date    VLDL 34 10/19/2021    VLDL 46 (H) 06/08/2020     Results for orders placed during the hospital encounter of 10/18/21    Adult Transthoracic Echo Complete W/ Cont if Necessary Per Protocol (With Agitated Saline)    Interpretation Summary  · Calculated left ventricular EF = 70% Estimated left ventricular EF was in agreement with the calculated left ventricular EF.  · Left ventricular diastolic function is consistent with (grade I) impaired relaxation.  · No hemodynamically significant valvular pathology.     Impression/Plan:   1.  Essential hypertension controlled: Continue Zestril 40 mg once a day.  Current amlodipine 10 mg once a  day.  Continue carvedilol 12.5 mg twice a day.  Monitor blood pressure regularly.  2.  Stable palpitations: Continue carvedilol 12.5 mg twice a day.  No further palpitations   3.  Aortic stenosis: Echocardiogram.        Clifford Donald MD   10/04/24   11:39 EDT

## 2024-10-04 ENCOUNTER — OFFICE VISIT (OUTPATIENT)
Dept: CARDIOLOGY | Facility: CLINIC | Age: 74
End: 2024-10-04
Payer: MEDICARE

## 2024-10-04 VITALS
WEIGHT: 168 LBS | HEIGHT: 62 IN | SYSTOLIC BLOOD PRESSURE: 125 MMHG | BODY MASS INDEX: 30.91 KG/M2 | HEART RATE: 81 BPM | DIASTOLIC BLOOD PRESSURE: 52 MMHG

## 2024-10-04 DIAGNOSIS — I35.0 NONRHEUMATIC AORTIC VALVE STENOSIS: ICD-10-CM

## 2024-10-04 DIAGNOSIS — R00.2 PALPITATIONS: ICD-10-CM

## 2024-10-04 DIAGNOSIS — I10 HYPERTENSION, ESSENTIAL: Primary | ICD-10-CM

## 2024-10-04 PROCEDURE — 99214 OFFICE O/P EST MOD 30 MIN: CPT | Performed by: SPECIALIST

## 2024-10-04 PROCEDURE — 3074F SYST BP LT 130 MM HG: CPT | Performed by: SPECIALIST

## 2024-10-04 PROCEDURE — 1159F MED LIST DOCD IN RCRD: CPT | Performed by: SPECIALIST

## 2024-10-04 PROCEDURE — 1160F RVW MEDS BY RX/DR IN RCRD: CPT | Performed by: SPECIALIST

## 2024-10-04 PROCEDURE — 3078F DIAST BP <80 MM HG: CPT | Performed by: SPECIALIST

## 2024-10-18 ENCOUNTER — TRANSCRIBE ORDERS (OUTPATIENT)
Dept: ADMINISTRATIVE | Facility: HOSPITAL | Age: 74
End: 2024-10-18
Payer: MEDICARE

## 2024-10-18 DIAGNOSIS — Z12.31 SCREENING MAMMOGRAM, ENCOUNTER FOR: Primary | ICD-10-CM

## 2024-10-24 ENCOUNTER — HOSPITAL ENCOUNTER (OUTPATIENT)
Dept: CARDIOLOGY | Facility: HOSPITAL | Age: 74
Discharge: HOME | End: 2024-10-24
Admitting: SPECIALIST
Payer: MEDICARE

## 2024-10-24 DIAGNOSIS — I35.0 NONRHEUMATIC AORTIC VALVE STENOSIS: ICD-10-CM

## 2024-10-24 LAB
ASCENDING AORTA: 3 CM
BH CV ECHO MEAS - ACS: 1.22 CM
BH CV ECHO MEAS - AO MAX PG: 9 MMHG
BH CV ECHO MEAS - AO MEAN PG: 4.9 MMHG
BH CV ECHO MEAS - AO ROOT DIAM: 2.5 CM
BH CV ECHO MEAS - AO V2 MAX: 149.8 CM/SEC
BH CV ECHO MEAS - AO V2 VTI: 34.6 CM
BH CV ECHO MEAS - EDV(MOD-SP2): 38.7 ML
BH CV ECHO MEAS - EDV(MOD-SP4): 49.3 ML
BH CV ECHO MEAS - EF(MOD-BP): 58.2 %
BH CV ECHO MEAS - EF(MOD-SP2): 57.4 %
BH CV ECHO MEAS - EF(MOD-SP4): 59.9 %
BH CV ECHO MEAS - ESV(MOD-SP2): 16.5 ML
BH CV ECHO MEAS - ESV(MOD-SP4): 19.7 ML
BH CV ECHO MEAS - IVS/LVPW: 0.89 CM
BH CV ECHO MEAS - IVSD: 0.8 CM
BH CV ECHO MEAS - LA DIMENSION: 3.4 CM
BH CV ECHO MEAS - LAT PEAK E' VEL: 9.1 CM/SEC
BH CV ECHO MEAS - LV MAX PG: 4 MMHG
BH CV ECHO MEAS - LV MEAN PG: 2.05 MMHG
BH CV ECHO MEAS - LV V1 MAX: 100 CM/SEC
BH CV ECHO MEAS - LV V1 VTI: 26.8 CM
BH CV ECHO MEAS - LVIDD: 4.6 CM
BH CV ECHO MEAS - LVIDS: 3.1 CM
BH CV ECHO MEAS - LVOT DIAM: 2 CM
BH CV ECHO MEAS - LVPWD: 0.9 CM
BH CV ECHO MEAS - MED PEAK E' VEL: 8.2 CM/SEC
BH CV ECHO MEAS - MV A MAX VEL: 95.8 CM/SEC
BH CV ECHO MEAS - MV DEC TIME: 245 SEC
BH CV ECHO MEAS - MV E MAX VEL: 81.8 CM/SEC
BH CV ECHO MEAS - MV E/A: 0.85
BH CV ECHO MEAS - TAPSE (>1.6): 2.16 CM
BH CV ECHO MEASUREMENTS AVERAGE E/E' RATIO: 9.46

## 2024-10-24 PROCEDURE — 93306 TTE W/DOPPLER COMPLETE: CPT

## 2024-10-25 ENCOUNTER — TELEPHONE (OUTPATIENT)
Dept: CARDIOLOGY | Facility: CLINIC | Age: 74
End: 2024-10-25
Payer: MEDICARE

## 2024-10-25 NOTE — TELEPHONE ENCOUNTER
----- Message from Lety Whitten sent at 10/25/2024  9:50 AM EDT -----  Echocardiogram shows normal heart muscle function and no significant valve disease noted. Follow up as scheduled. Notify office of any new/worsening cardiac symptoms

## 2024-12-26 RX ORDER — CARVEDILOL 12.5 MG/1
12.5 TABLET ORAL 2 TIMES DAILY
Qty: 180 TABLET | Refills: 3 | Status: SHIPPED | OUTPATIENT
Start: 2024-12-26

## 2025-03-14 ENCOUNTER — HOSPITAL ENCOUNTER (OUTPATIENT)
Dept: MAMMOGRAPHY | Facility: HOSPITAL | Age: 75
Discharge: HOME | End: 2025-03-14
Admitting: NURSE PRACTITIONER
Payer: MEDICARE

## 2025-03-14 DIAGNOSIS — Z12.31 SCREENING MAMMOGRAM, ENCOUNTER FOR: ICD-10-CM

## 2025-03-14 PROCEDURE — 77063 BREAST TOMOSYNTHESIS BI: CPT

## 2025-03-14 PROCEDURE — 77067 SCR MAMMO BI INCL CAD: CPT

## 2025-04-04 NOTE — PROGRESS NOTES
[facility]  Cardiology progress Note    Patient Name: Kimber Carranza  : 1950    CHIEF COMPLAINT  Palpitation        Subjective   Subjective     HISTORY OF PRESENT ILLNESS    Kimber Carranza is a 75 y.o. female with history of palpitations.  No chest pain.    REVIEW OF SYSTEMS    Constitutional:    No fever, no weight loss  Skin:     No rash  Otolaryngeal:    No difficulty swallowing  Cardiovascular: See HPI.  Pulmonary:    No cough, no sputum production    Personal History     Social History:    reports that she has never smoked. She has never used smokeless tobacco. She reports that she does not drink alcohol and does not use drugs.    Home Medications:  Current Outpatient Medications on File Prior to Visit   Medication Sig    acetaminophen (TYLENOL) 325 MG tablet Take 2 tablets by mouth Daily As Needed.    amLODIPine (NORVASC) 10 MG tablet Take 1 tablet by mouth Daily.    carvedilol (COREG) 12.5 MG tablet TAKE 1 TABLET BY MOUTH TWICE A DAY    cholecalciferol (VITAMIN D3) 25 MCG (1000 UT) tablet Take 1 tablet by mouth Daily.    glucose blood test strip 1 each by Other route Daily.    hydroCHLOROthiazide (MICROZIDE) 12.5 MG capsule Take 2 capsules by mouth Daily.    metFORMIN (GLUCOPHAGE) 1000 MG tablet Take 1 tablet by mouth 2 (two) times a day.    Omega-3 Fatty Acids (fish oil) 1000 MG capsule capsule Take 1,200 mg by mouth Daily With Breakfast.    orphenadrine (NORFLEX) 100 MG 12 hr tablet Take 1 tablet by mouth 2 (Two) Times a Day As Needed for Muscle Spasms.    pantoprazole (PROTONIX) 40 MG EC tablet Take 1 tablet by mouth Every Morning.    lisinopril (PRINIVIL,ZESTRIL) 40 MG tablet Take 1 tablet by mouth Daily for 30 days.     No current facility-administered medications on file prior to visit.       Past Medical History:   Diagnosis Date    Diabetes mellitus     Elevated cholesterol     Essential hypertension 2021    Palpitations 2021    Stroke        Allergies:  Allergies   Allergen  Reactions    Statins GI Intolerance       Objective    Objective       Vitals:   Heart Rate:  [68] 68  BP: (132)/(68) 132/68  Body mass index is 29.81 kg/m².     PHYSICAL EXAM:    General Appearance:   well developed  well nourished  HENT:   oropharynx moist  lips not cyanotic  Neck:  thyroid not enlarged  supple  Respiratory:  no respiratory distress  normal breath sounds  no rales  Cardiovascular:  no jugular venous distention  regular rhythm  apical impulse normal  S1 normal, S2 normal  no S3, no S4   no murmur  no rub, no thrill  carotid pulses normal; no bruit  pedal pulses normal  lower extremity edema: none    Skin:   warm, dry  Psychiatric:  judgement and insight appropriate  normal mood and affect        Result Review:  I have personally reviewed the available results from  [x]  Laboratory  [x]  EKG  [x]  Cardiology  [x]  Medications  [x]  Old records  []  Other:     Procedures  Lab Results   Component Value Date    CHOL 162 10/19/2021     Lab Results   Component Value Date    TRIG 202 (H) 10/19/2021    TRIG 229 (H) 06/08/2020     Lab Results   Component Value Date    HDL 38 (L) 10/19/2021    HDL 44 (L) 06/08/2020     Lab Results   Component Value Date    LDL 90 10/19/2021     (H) 06/08/2020     Lab Results   Component Value Date    VLDL 34 10/19/2021    VLDL 46 (H) 06/08/2020     Results for orders placed during the hospital encounter of 10/24/24    Adult Transthoracic Echo Complete W/ Cont if Necessary Per Protocol    Interpretation Summary    Left ventricular ejection fraction appears to be 56 - 60%.    Left ventricular diastolic function is consistent with (grade I) impaired relaxation.     Impression/Plan:  1.  Essential hypertension controlled: Continue Zestril 40 mg once a day.  Current amlodipine 10 mg once a day.  Continue carvedilol 12.5 mg twice a day.  Monitor blood pressure regularly.  2.  Stable palpitations: Continue carvedilol 12.5 mg twice a day.  No further palpitations                    Clifford Donald MD   04/08/25   11:47 EDT

## 2025-04-08 ENCOUNTER — OFFICE VISIT (OUTPATIENT)
Dept: CARDIOLOGY | Facility: CLINIC | Age: 75
End: 2025-04-08
Payer: MEDICARE

## 2025-04-08 VITALS
SYSTOLIC BLOOD PRESSURE: 132 MMHG | WEIGHT: 163 LBS | DIASTOLIC BLOOD PRESSURE: 68 MMHG | HEART RATE: 68 BPM | BODY MASS INDEX: 30 KG/M2 | HEIGHT: 62 IN

## 2025-04-08 DIAGNOSIS — R00.2 PALPITATIONS: ICD-10-CM

## 2025-04-08 DIAGNOSIS — I10 HYPERTENSION, ESSENTIAL: Primary | ICD-10-CM

## 2025-04-08 PROCEDURE — 3075F SYST BP GE 130 - 139MM HG: CPT | Performed by: SPECIALIST

## 2025-04-08 PROCEDURE — 3078F DIAST BP <80 MM HG: CPT | Performed by: SPECIALIST

## 2025-04-08 PROCEDURE — 99214 OFFICE O/P EST MOD 30 MIN: CPT | Performed by: SPECIALIST

## 2025-06-10 ENCOUNTER — OFFICE VISIT (OUTPATIENT)
Dept: SURGERY | Facility: CLINIC | Age: 75
End: 2025-06-10
Payer: MEDICARE

## 2025-06-10 ENCOUNTER — PREP FOR SURGERY (OUTPATIENT)
Dept: OTHER | Facility: HOSPITAL | Age: 75
End: 2025-06-10
Payer: MEDICARE

## 2025-06-10 VITALS
HEART RATE: 70 BPM | BODY MASS INDEX: 30.1 KG/M2 | OXYGEN SATURATION: 98 % | DIASTOLIC BLOOD PRESSURE: 67 MMHG | WEIGHT: 163.58 LBS | SYSTOLIC BLOOD PRESSURE: 127 MMHG | HEIGHT: 62 IN

## 2025-06-10 DIAGNOSIS — Z86.0100 HISTORY OF COLONIC POLYPS: ICD-10-CM

## 2025-06-10 DIAGNOSIS — Z12.11 SCREENING FOR MALIGNANT NEOPLASM OF COLON: Primary | ICD-10-CM

## 2025-06-10 DIAGNOSIS — Z12.11 ENCOUNTER FOR SCREENING FOR MALIGNANT NEOPLASM OF COLON: Primary | ICD-10-CM

## 2025-06-10 RX ORDER — SODIUM CHLORIDE 0.9 % (FLUSH) 0.9 %
3 SYRINGE (ML) INJECTION EVERY 12 HOURS SCHEDULED
OUTPATIENT
Start: 2025-06-10

## 2025-06-10 RX ORDER — SODIUM CHLORIDE 0.9 % (FLUSH) 0.9 %
10 SYRINGE (ML) INJECTION AS NEEDED
OUTPATIENT
Start: 2025-06-10

## 2025-06-10 RX ORDER — DICYCLOMINE HCL 20 MG
20 TABLET ORAL 4 TIMES DAILY
COMMUNITY
Start: 2025-05-22

## 2025-06-10 RX ORDER — POLYETHYLENE GLYCOL 3350 17 G/17G
POWDER, FOR SOLUTION ORAL
Qty: 238 PACKET | Refills: 0 | Status: SHIPPED | OUTPATIENT
Start: 2025-06-10

## 2025-06-10 RX ORDER — SODIUM CHLORIDE 9 MG/ML
40 INJECTION, SOLUTION INTRAVENOUS AS NEEDED
OUTPATIENT
Start: 2025-06-10

## 2025-06-10 NOTE — PROGRESS NOTES
Chief Complaint: Colonoscopy    Subjective      Colonoscopy consultation       History of Present Illness  Kimber Carranza is a 75 y.o. female presents to [facility] for colonoscopy consultation.    Patient presents today without complaints for screening colonoscopy.  She denies any abdominal pain, change in bowel habit, or rectal bleeding.  Admits to family history of colon cancer with her mother.  Admits to history of colon polyps.    Denies JIMMY.  Denies taking any GLP-receptors.    Patient does report that she has palpitations and is under the care of Dr. Donald.    1/20: Colonoscopy (Shell): Cecum- lymphoid aggregate; Rectum - tubular adenoma.       Objective     Past Medical History:   Diagnosis Date    Colon polyp     Diabetes mellitus     Elevated cholesterol     Essential hypertension 07/28/2021    Palpitations 07/28/2021    Stroke        Past Surgical History:   Procedure Laterality Date    HYSTERECTOMY         Outpatient Medications Marked as Taking for the 6/10/25 encounter (Office Visit) with Karlo April, APRN   Medication Sig Dispense Refill    acetaminophen (TYLENOL) 325 MG tablet Take 2 tablets by mouth Daily As Needed.      amLODIPine (NORVASC) 10 MG tablet Take 1 tablet by mouth Daily.      carvedilol (COREG) 12.5 MG tablet TAKE 1 TABLET BY MOUTH TWICE A  tablet 3    cholecalciferol (VITAMIN D3) 25 MCG (1000 UT) tablet Take 1 tablet by mouth Daily.      dicyclomine (BENTYL) 20 MG tablet Take 1 tablet by mouth 4 (Four) Times a Day.      glucose blood test strip 1 each by Other route Daily.      hydroCHLOROthiazide (MICROZIDE) 12.5 MG capsule Take 2 capsules by mouth Daily. 90 capsule 3    metFORMIN (GLUCOPHAGE) 1000 MG tablet Take 1 tablet by mouth 2 (two) times a day.      Omega-3 Fatty Acids (fish oil) 1000 MG capsule capsule Take 1,200 mg by mouth Daily With Breakfast.      orphenadrine (NORFLEX) 100 MG 12 hr tablet Take 1 tablet by mouth 2 (Two) Times a  "Day As Needed for Muscle Spasms. 10 tablet 0    pantoprazole (PROTONIX) 40 MG EC tablet Take 1 tablet by mouth Every Morning. 30 tablet 0       Allergies   Allergen Reactions    Statins GI Intolerance        Family History   Problem Relation Age of Onset    Hypertension Father     Diabetes Father     Arthritis Mother     Cancer Mother     Cancer Daughter        Social History     Socioeconomic History    Marital status:    Tobacco Use    Smoking status: Never    Smokeless tobacco: Never   Vaping Use    Vaping status: Never Used   Substance and Sexual Activity    Alcohol use: Never    Drug use: Never    Sexual activity: Not Currently     Partners: Male     Birth control/protection: Hysterectomy       Review of Systems   Constitutional:  Negative for chills and fever.   Gastrointestinal:  Negative for abdominal distention, abdominal pain, anal bleeding, blood in stool, constipation, diarrhea and rectal pain.        Vital Signs:   /67 (BP Location: Left arm, Patient Position: Sitting, Cuff Size: Large Adult)   Pulse 70   Ht 157.5 cm (62\")   Wt 74.2 kg (163 lb 9.3 oz)   SpO2 98%   BMI 29.92 kg/m²      Physical Exam  Vitals and nursing note reviewed.   Constitutional:       General: She is not in acute distress.     Appearance: Normal appearance. She is not ill-appearing.   HENT:      Head: Normocephalic and atraumatic.   Cardiovascular:      Rate and Rhythm: Normal rate.   Pulmonary:      Effort: Pulmonary effort is normal.      Breath sounds: No stridor.   Abdominal:      Palpations: Abdomen is soft.      Tenderness: There is no guarding.   Musculoskeletal:         General: No deformity. Normal range of motion.   Skin:     General: Skin is warm and dry.      Coloration: Skin is not jaundiced.   Neurological:      General: No focal deficit present.      Mental Status: She is alert and oriented to person, place, and time.   Psychiatric:         Mood and Affect: Mood normal.         Thought Content: " Thought content normal.          Result Review :          []  Laboratory  []  Radiology  []  Pathology  []  Microbiology  []  EKG/Telemetry   []  Cardiology/Vascular   []  Old records  I spent 15 minutes caring for Kimber on this date of service. This time includes time spent by me in the following activities: reviewing tests, obtaining and/or reviewing a separately obtained history, performing a medically appropriate examination and/or evaluation, ordering medications, tests, or procedures, and documenting information in the medical record        Assessment and Plan    Diagnoses and all orders for this visit:    1. Encounter for screening for malignant neoplasm of colon (Primary)    2. History of colonic polyps    Other orders  -     polyethylene glycol (MIRALAX) 17 g packet; Take as directed.  Instructions given in office.  Dispense: 238 g bottle  Dispense: 238 packet; Refill: 0    Cardiac clearance from [place].      Follow Up   Return for Schedule colonoscopy with Dr. Shell on 7/23/2025 at [facility].    Hospital arrival time:0930    Possible risks/complications, benefits, and alternatives to surgical or invasive procedures have been explained to patient and/or legal guardian.    Patient has been evaluated and can tolerate anesthesia and/or sedation. Risks, benefits, and alternatives to anesthesia and sedation have been explained to the patient and/or legal guardian. Patient verbalizes understanding and is willing to proceed with the above plan.     Patient was given instructions and counseling regarding her condition or for health maintenance advice. Please see specific information pulled into the AVS if appropriate.     As always, it has been a pleasure to participate in your patient's care. Please call with questions or concerns.

## 2025-06-16 ENCOUNTER — TELEPHONE (OUTPATIENT)
Dept: SURGERY | Facility: CLINIC | Age: 75
End: 2025-06-16
Payer: MEDICARE

## 2025-06-16 NOTE — TELEPHONE ENCOUNTER
Patient r/s colonoscopy to 7/30/25 with a 9:30am arrival time. Spoke with Sofya in surgery scheduling.

## 2025-06-16 NOTE — TELEPHONE ENCOUNTER
PATIENT CALLED AND HAS A COLONOSCOPY SCHEDULED 07/23/25 AND SHE NEEDS TO RESCHEDULE IT.    #[phone]

## 2025-06-25 RX ORDER — CARVEDILOL 12.5 MG/1
12.5 TABLET ORAL 2 TIMES DAILY
Qty: 180 TABLET | Refills: 3 | Status: SHIPPED | OUTPATIENT
Start: 2025-06-25

## 2025-06-25 NOTE — TELEPHONE ENCOUNTER
Rx Refill Note  Requested Prescriptions     Pending Prescriptions Disp Refills    carvedilol (COREG) 12.5 MG tablet [Pharmacy Med Name: CARVEDILOL 12.5 MG TABLET] 180 tablet 3     Sig: TAKE 1 TABLET BY MOUTH 2 TIMES A DAY        LAST OFFICE VISIT:  4/8/2025     NEXT OFFICE VISIT:  10/16/2025     Does the medication requests match the last office note:    [x] Yes   [] No    Does this refill request meet protocol details for MA to approve:     [x] Yes   [] No   [] No Protocols Provided

## 2025-07-07 ENCOUNTER — TELEPHONE (OUTPATIENT)
Dept: SURGERY | Facility: CLINIC | Age: 75
End: 2025-07-07
Payer: MEDICARE

## 2025-07-07 NOTE — TELEPHONE ENCOUNTER
[facility] [place]  [facility]  [address]  [place] [address]  Fax [phone]  Phone [phone]     Patient: Kmiber Carranza  YOB: 1950      This patient is waiting to have a Colonoscopy which Dr. Shell will be performing at [facility] on 7/30/25.       Please respond to this request noting your recommendations. You may contact our office at [phone] Option 1 then 4 with any questions. I appreciate your prompt response in this matter.       Please return this form to our office as soon as possible to [phone].      ____ I approve my patient from a cardiac standpoint.    ____ I do NOT approve my patient from a cardiac standpoint at this time.    Approving physician name (please print):     _____________________________________________    Approving physician signature:     ________________________________                  Date:________________      Sincerely,    CAMI Baltazar

## 2025-07-07 NOTE — TELEPHONE ENCOUNTER
Procedure: COLONOSCOPY AND/OR EGD    Medication Directive: NA    PMH: HTN, Palpitations    Last Seen: 1950

## 2025-07-16 NOTE — PAT
Left message on voicemail with arrival time of  0930.    Come to [facility], entrance [address]. Bring picture ID and insurance card, have  over 18 to give ride home.     Bring medication list but do not take any meds except inhalers that morning. Bring medications with you to the hospital, including inhalers.     Complete prep prior to arrival. Clear liquids all day the day before, and start prep as instructed.     Complete prep and any water may need to drink at least 2 hours prior to arrival. No gum, mints, water, or anything else in mouth after that.      Plan to be here at least 3-4 hours. Do not bring any valuables with you to the hospital.     Call [phone] with any questions.

## 2025-07-17 NOTE — PAT
2nd PAT call attempted, no answer. Left message requesting call back to confirm receipt of previous message and if plan to be at procedure and any questions. Left number [phone].

## 2025-07-29 ENCOUNTER — ANESTHESIA EVENT (OUTPATIENT)
Dept: GASTROENTEROLOGY | Facility: HOSPITAL | Age: 75
End: 2025-07-29
Payer: MEDICARE

## 2025-07-29 NOTE — ANESTHESIA PREPROCEDURE EVALUATION
Anesthesia Evaluation     Patient summary reviewed and Nursing notes reviewed   NPO Solid Status: > 8 hours  NPO Liquid Status: > 4 hours           Airway   Mallampati: II  TM distance: >3 FB  Neck ROM: full  No difficulty expected  Dental - normal exam     Pulmonary - negative pulmonary ROS    breath sounds clear to auscultation  Cardiovascular - normal exam  Exercise tolerance: good (4-7 METS)    ECG reviewed  Patient on routine beta blocker  Rhythm: regular  Rate: normal    (+) hypertension well controlled, hyperlipidemia      Neuro/Psych  (+) CVA (2023 - forgetful when tired)  GI/Hepatic/Renal/Endo    (+) GERD (protonix), diabetes mellitus type 2 well controlled    Musculoskeletal (-) negative ROS    Abdominal    Substance History - negative use     OB/GYN negative ob/gyn ROS         Other        ROS/Med Hx Other: Cardiac clearance with acceptable risk per Challappa on 07/08/25     Echo 10/24/24    Left Ventricle Left ventricular ejection fraction appears to be 56 - 60%.   Normal left ventricular cavity size and wall thickness noted. Left ventricular diastolic function is consistent with (grade I) impaired relaxation.  Right Ventricle Normal right ventricular cavity size, wall thickness, systolic function and septal motion noted.  Left Atrium Normal left atrial cavity size noted. No evidence of a patent foramen ovale. No evidence of an atrial septal defect present.  Right Atrium Normal right atrial cavity size noted. The inferior vena cava is normally sized. The diameter of the inferior vena cava is 2 cm.  Aortic Valve No aortic valve regurgitation or stenosis is present. The aortic valve is grossly normal in structure. The aortic valve appears trileaflet.  Mitral Valve Mild mitral annular calcification is present. No significant mitral valve regurgitation is present.  Tricuspid Valve The tricuspid valve is normal in structure. Physiologic tricuspid valve regurgitation is present.  Pulmonic Valve The pulmonic  valve is structurally normal. There is no significant pulmonic valve regurgitation present.  Greater Vessels No dilation of the aortic root is present.  Pericardium There is no evidence of pericardial effusion. .    EKG 10/22/21: HR 62,   Sinus rhythm  Left axis deviation  Left anterior fascicular block                  Anesthesia Plan    ASA 3     general   total IV anesthesia  (Risks explained including allergic reactions, BP, HR, O2 changes, aspiration, apnea, advanced airway placement. Pt verbalized understanding. Patient understands anesthesia not responsible for dental damage.)  intravenous induction     Anesthetic plan, risks, benefits, and alternatives have been provided, discussed and informed consent has been obtained with: patient.  Pre-procedure education provided  Plan discussed with CRNA.      CODE STATUS:

## 2025-07-30 ENCOUNTER — HOSPITAL ENCOUNTER (OUTPATIENT)
Facility: HOSPITAL | Age: 75
Setting detail: HOSPITAL OUTPATIENT SURGERY
Discharge: HOME | End: 2025-07-30
Attending: SURGERY | Admitting: SURGERY
Payer: MEDICARE

## 2025-07-30 ENCOUNTER — ANESTHESIA (OUTPATIENT)
Dept: GASTROENTEROLOGY | Facility: HOSPITAL | Age: 75
End: 2025-07-30
Payer: MEDICARE

## 2025-07-30 VITALS
TEMPERATURE: 97 F | OXYGEN SATURATION: 95 % | BODY MASS INDEX: 28.91 KG/M2 | SYSTOLIC BLOOD PRESSURE: 123 MMHG | HEIGHT: 63 IN | HEART RATE: 57 BPM | RESPIRATION RATE: 20 BRPM | DIASTOLIC BLOOD PRESSURE: 63 MMHG | WEIGHT: 163.14 LBS

## 2025-07-30 DIAGNOSIS — Z12.11 SCREENING FOR MALIGNANT NEOPLASM OF COLON: ICD-10-CM

## 2025-07-30 DIAGNOSIS — Z86.0100 HISTORY OF COLONIC POLYPS: ICD-10-CM

## 2025-07-30 LAB — GLUCOSE BLDC GLUCOMTR-MCNC: 124 MG/DL (ref 70–99)

## 2025-07-30 PROCEDURE — 25010000002 PROPOFOL 10 MG/ML EMULSION: Performed by: NURSE ANESTHETIST, CERTIFIED REGISTERED

## 2025-07-30 PROCEDURE — 25010000002 LIDOCAINE PF 2% 2 % SOLUTION: Performed by: NURSE ANESTHETIST, CERTIFIED REGISTERED

## 2025-07-30 PROCEDURE — 25810000003 LACTATED RINGERS PER 1000 ML

## 2025-07-30 PROCEDURE — 82948 REAGENT STRIP/BLOOD GLUCOSE: CPT

## 2025-07-30 RX ORDER — BUPIVACAINE HCL/0.9 % NACL/PF 0.125 %
PLASTIC BAG, INJECTION (ML) EPIDURAL AS NEEDED
Status: DISCONTINUED | OUTPATIENT
Start: 2025-07-30 | End: 2025-07-30 | Stop reason: SURG

## 2025-07-30 RX ORDER — LIDOCAINE HYDROCHLORIDE 20 MG/ML
INJECTION, SOLUTION EPIDURAL; INFILTRATION; INTRACAUDAL; PERINEURAL AS NEEDED
Status: DISCONTINUED | OUTPATIENT
Start: 2025-07-30 | End: 2025-07-30 | Stop reason: SURG

## 2025-07-30 RX ORDER — PROPOFOL 10 MG/ML
VIAL (ML) INTRAVENOUS AS NEEDED
Status: DISCONTINUED | OUTPATIENT
Start: 2025-07-30 | End: 2025-07-30 | Stop reason: SURG

## 2025-07-30 RX ORDER — SODIUM CHLORIDE 0.9 % (FLUSH) 0.9 %
3 SYRINGE (ML) INJECTION EVERY 12 HOURS SCHEDULED
Status: DISCONTINUED | OUTPATIENT
Start: 2025-07-30 | End: 2025-07-30 | Stop reason: HOSPADM

## 2025-07-30 RX ORDER — SODIUM CHLORIDE, SODIUM LACTATE, POTASSIUM CHLORIDE, CALCIUM CHLORIDE 600; 310; 30; 20 MG/100ML; MG/100ML; MG/100ML; MG/100ML
30 INJECTION, SOLUTION INTRAVENOUS CONTINUOUS
Status: DISCONTINUED | OUTPATIENT
Start: 2025-07-30 | End: 2025-07-30 | Stop reason: HOSPADM

## 2025-07-30 RX ORDER — SODIUM CHLORIDE 9 MG/ML
40 INJECTION, SOLUTION INTRAVENOUS AS NEEDED
Status: DISCONTINUED | OUTPATIENT
Start: 2025-07-30 | End: 2025-07-30 | Stop reason: HOSPADM

## 2025-07-30 RX ORDER — SODIUM CHLORIDE 0.9 % (FLUSH) 0.9 %
10 SYRINGE (ML) INJECTION AS NEEDED
Status: DISCONTINUED | OUTPATIENT
Start: 2025-07-30 | End: 2025-07-30 | Stop reason: HOSPADM

## 2025-07-30 RX ADMIN — PROPOFOL 70 MG: 10 INJECTION, EMULSION INTRAVENOUS at 10:23

## 2025-07-30 RX ADMIN — PROPOFOL 175 MCG/KG/MIN: 10 INJECTION, EMULSION INTRAVENOUS at 10:23

## 2025-07-30 RX ADMIN — Medication 100 MCG: at 10:33

## 2025-07-30 RX ADMIN — LIDOCAINE HYDROCHLORIDE 60 MG: 20 INJECTION, SOLUTION INTRAVENOUS at 10:23

## 2025-07-30 RX ADMIN — SODIUM CHLORIDE, POTASSIUM CHLORIDE, SODIUM LACTATE AND CALCIUM CHLORIDE 30 ML/HR: 600; 310; 30; 20 INJECTION, SOLUTION INTRAVENOUS at 09:47

## 2025-07-30 NOTE — H&P
Chief Complaint: Colonoscopy    Patient is here to have a colonoscopy.  Following is a copy of the HPI from the patient's office visit at the surgery clinic.      History of Present Illness  Kimber Carranza is a 75 y.o. female presents to [facility] for colonoscopy consultation.    Patient presents today without complaints for screening colonoscopy.  She denies any abdominal pain, change in bowel habit, or rectal bleeding.  Admits to family history of colon cancer with her mother.  Admits to history of colon polyps.    Denies JIMMY.  Denies taking any GLP-receptors.    Patient does report that she has palpitations and is under the care of Dr. Donald.    1/20: Colonoscopy (Shell): Cecum- lymphoid aggregate; Rectum - tubular adenoma.       Objective     Past Medical History:   Diagnosis Date    Colon polyp     Diabetes mellitus     Elevated cholesterol     Essential hypertension 07/28/2021    Palpitations 07/28/2021    Stroke        Past Surgical History:   Procedure Laterality Date    HYSTERECTOMY         Outpatient Medications Marked as Taking for the 6/10/25 encounter (Office Visit) with Naa Dietrich, APRELIZABETH   Medication Sig Dispense Refill    acetaminophen (TYLENOL) 325 MG tablet Take 2 tablets by mouth Daily As Needed.      amLODIPine (NORVASC) 10 MG tablet Take 1 tablet by mouth Daily.      carvedilol (COREG) 12.5 MG tablet TAKE 1 TABLET BY MOUTH TWICE A  tablet 3    cholecalciferol (VITAMIN D3) 25 MCG (1000 UT) tablet Take 1 tablet by mouth Daily.      dicyclomine (BENTYL) 20 MG tablet Take 1 tablet by mouth 4 (Four) Times a Day.      glucose blood test strip 1 each by Other route Daily.      hydroCHLOROthiazide (MICROZIDE) 12.5 MG capsule Take 2 capsules by mouth Daily. 90 capsule 3    metFORMIN (GLUCOPHAGE) 1000 MG tablet Take 1 tablet by mouth 2 (two) times a day.      Omega-3 Fatty Acids (fish oil) 1000 MG capsule capsule Take 1,200 mg by mouth Daily With Breakfast.       orphenadrine (NORFLEX) 100 MG 12 hr tablet Take 1 tablet by mouth 2 (Two) Times a Day As Needed for Muscle Spasms. 10 tablet 0    pantoprazole (PROTONIX) 40 MG EC tablet Take 1 tablet by mouth Every Morning. 30 tablet 0       Allergies   Allergen Reactions    Statins GI Intolerance        Family History   Problem Relation Age of Onset    Hypertension Father     Diabetes Father     Arthritis Mother     Cancer Mother     Cancer Daughter        Social History     Socioeconomic History    Marital status:    Tobacco Use    Smoking status: Never    Smokeless tobacco: Never   Vaping Use    Vaping status: Never Used   Substance and Sexual Activity    Alcohol use: Never    Drug use: Never    Sexual activity: Not Currently     Partners: Male     Birth control/protection: Hysterectomy       Physical Exam  Vitals - Available in the EMR.   Respiratory:  breathing not labored, respiratory effort appears normal  Cardiovascular:  heart regular rate      Assessment   Screening colonoscopy  Personal history of colon polyps    Plan  Colonoscopy    Risks and benefits discussed    David Shell M.D.  07/30/25    Electronically signed by David Shell MD, 07/30/25, 7:17 AM EDT.

## 2025-07-30 NOTE — ANESTHESIA POSTPROCEDURE EVALUATION
Patient: Kimber Carranza    Procedure Summary       Date: 07/30/25 Room / Location: [facility] ENDOSCOPY 1 / [facility] ENDOSCOPY    Anesthesia Start: 1020 Anesthesia Stop: 1046    Procedure: COLONOSCOPY Diagnosis:       Screening for malignant neoplasm of colon      History of colonic polyps      (Screening for malignant neoplasm of colon [Z12.11])      (History of colonic polyps [Z86.0100])    Surgeons: David Shell MD Provider: Kavitha Sampson CRNA    Anesthesia Type: general ASA Status: 3            Anesthesia Type: general    Vitals  Vitals Value Taken Time   /63 07/30/25 10:58   Temp 36.1 °C (97 °F) 07/30/25 10:57   Pulse 61 07/30/25 11:01   Resp 20 07/30/25 10:57   SpO2 95 % 07/30/25 11:01   Vitals shown include unfiled device data.        Post Anesthesia Care and Evaluation    Post-procedure mental status: acceptable.  Pain management: satisfactory to patient    Airway patency: patent  Anesthetic complications: No anesthetic complications    Cardiovascular status: acceptable  Respiratory status: acceptable    Comments: Per chart review

## (undated) DEVICE — GLV SURG BIOGEL LTX PF 7 1/2

## (undated) DEVICE — SOLIDIFIER LIQLOC PLS 1500CC BT

## (undated) DEVICE — SOL IRR NACL 0.9PCT BO 1000ML

## (undated) DEVICE — SOL IRRG H2O PL/BG 1000ML STRL

## (undated) DEVICE — LINER SURG CANSTR SXN S/RIGD 1500CC

## (undated) DEVICE — Device

## (undated) DEVICE — DEFENDO AIR WATER SUCTION AND BIOPSY VALVE KIT FOR  OLYMPUS: Brand: DEFENDO AIR/WATER/SUCTION AND BIOPSY VALVE

## (undated) DEVICE — CONN JET HYDRA H20 AUXILIARY DISP

## (undated) DEVICE — THE STERILE LIGHT HANDLE COVER IS USED WITH STERIS SURGICAL LIGHTING AND VISUALIZATION SYSTEMS.